# Patient Record
Sex: FEMALE | Race: WHITE | HISPANIC OR LATINO | Employment: FULL TIME | URBAN - METROPOLITAN AREA
[De-identification: names, ages, dates, MRNs, and addresses within clinical notes are randomized per-mention and may not be internally consistent; named-entity substitution may affect disease eponyms.]

---

## 2017-02-14 ENCOUNTER — ALLSCRIPTS OFFICE VISIT (OUTPATIENT)
Dept: OTHER | Facility: OTHER | Age: 22
End: 2017-02-14

## 2017-02-14 DIAGNOSIS — Z20.2 CONTACT WITH AND (SUSPECTED) EXPOSURE TO INFECTIONS WITH A PREDOMINANTLY SEXUAL MODE OF TRANSMISSION: ICD-10-CM

## 2017-02-14 DIAGNOSIS — Z00.00 ENCOUNTER FOR GENERAL ADULT MEDICAL EXAMINATION WITHOUT ABNORMAL FINDINGS: ICD-10-CM

## 2017-02-14 DIAGNOSIS — N92.0 EXCESSIVE AND FREQUENT MENSTRUATION WITH REGULAR CYCLE: ICD-10-CM

## 2017-02-14 DIAGNOSIS — D50.9 IRON DEFICIENCY ANEMIA: ICD-10-CM

## 2017-02-15 ENCOUNTER — APPOINTMENT (OUTPATIENT)
Dept: LAB | Facility: HOSPITAL | Age: 22
End: 2017-02-15

## 2017-02-15 DIAGNOSIS — Z00.00 ENCOUNTER FOR GENERAL ADULT MEDICAL EXAMINATION WITHOUT ABNORMAL FINDINGS: ICD-10-CM

## 2017-02-15 LAB
ALBUMIN SERPL BCP-MCNC: 3.8 G/DL (ref 3.5–5)
ALP SERPL-CCNC: 67 U/L (ref 46–116)
ALT SERPL W P-5'-P-CCNC: 16 U/L (ref 12–78)
ANION GAP SERPL CALCULATED.3IONS-SCNC: 9 MMOL/L (ref 4–13)
AST SERPL W P-5'-P-CCNC: 14 U/L (ref 5–45)
BASOPHILS # BLD AUTO: 0.04 THOUSANDS/ΜL (ref 0–0.1)
BASOPHILS NFR BLD AUTO: 1 % (ref 0–1)
BILIRUB SERPL-MCNC: 0.32 MG/DL (ref 0.2–1)
BUN SERPL-MCNC: 14 MG/DL (ref 5–25)
CALCIUM SERPL-MCNC: 8.9 MG/DL (ref 8.3–10.1)
CHLORIDE SERPL-SCNC: 105 MMOL/L (ref 100–108)
CHOLEST SERPL-MCNC: 165 MG/DL (ref 50–200)
CO2 SERPL-SCNC: 24 MMOL/L (ref 21–32)
CREAT SERPL-MCNC: 0.83 MG/DL (ref 0.6–1.3)
EOSINOPHIL # BLD AUTO: 0.19 THOUSAND/ΜL (ref 0–0.61)
EOSINOPHIL NFR BLD AUTO: 3 % (ref 0–6)
ERYTHROCYTE [DISTWIDTH] IN BLOOD BY AUTOMATED COUNT: 19.5 % (ref 11.6–15.1)
FSH SERPL-ACNC: 7.6 MIU/ML
GFR SERPL CREATININE-BSD FRML MDRD: >60 ML/MIN/1.73SQ M
GLUCOSE SERPL-MCNC: 89 MG/DL (ref 65–140)
HCT VFR BLD AUTO: 30.5 % (ref 34.8–46.1)
HDLC SERPL-MCNC: 47 MG/DL (ref 40–60)
HGB BLD-MCNC: 8.2 G/DL (ref 11.5–15.4)
LDLC SERPL CALC-MCNC: 102 MG/DL (ref 0–100)
LYMPHOCYTES # BLD AUTO: 1.84 THOUSANDS/ΜL (ref 0.6–4.47)
LYMPHOCYTES NFR BLD AUTO: 33 % (ref 14–44)
MCH RBC QN AUTO: 17.3 PG (ref 26.8–34.3)
MCHC RBC AUTO-ENTMCNC: 26.9 G/DL (ref 31.4–37.4)
MCV RBC AUTO: 65 FL (ref 82–98)
MONOCYTES # BLD AUTO: 0.46 THOUSAND/ΜL (ref 0.17–1.22)
MONOCYTES NFR BLD AUTO: 8 % (ref 4–12)
NEUTROPHILS # BLD AUTO: 2.98 THOUSANDS/ΜL (ref 1.85–7.62)
NEUTS SEG NFR BLD AUTO: 55 % (ref 43–75)
NRBC BLD AUTO-RTO: 0 /100 WBCS
PLATELET # BLD AUTO: 608 THOUSANDS/UL (ref 149–390)
PMV BLD AUTO: 10 FL (ref 8.9–12.7)
POTASSIUM SERPL-SCNC: 4.9 MMOL/L (ref 3.5–5.3)
PROT SERPL-MCNC: 7.9 G/DL (ref 6.4–8.2)
RBC # BLD AUTO: 4.73 MILLION/UL (ref 3.81–5.12)
SODIUM SERPL-SCNC: 138 MMOL/L (ref 136–145)
TRIGL SERPL-MCNC: 80 MG/DL
TSH SERPL DL<=0.05 MIU/L-ACNC: 1.44 UIU/ML (ref 0.36–3.74)
WBC # BLD AUTO: 5.52 THOUSAND/UL (ref 4.31–10.16)

## 2017-02-15 PROCEDURE — 84443 ASSAY THYROID STIM HORMONE: CPT

## 2017-02-15 PROCEDURE — 80053 COMPREHEN METABOLIC PANEL: CPT

## 2017-02-15 PROCEDURE — 36415 COLL VENOUS BLD VENIPUNCTURE: CPT

## 2017-02-15 PROCEDURE — 83001 ASSAY OF GONADOTROPIN (FSH): CPT

## 2017-02-15 PROCEDURE — 85025 COMPLETE CBC W/AUTO DIFF WBC: CPT

## 2017-02-15 PROCEDURE — 80061 LIPID PANEL: CPT

## 2017-02-20 ENCOUNTER — GENERIC CONVERSION - ENCOUNTER (OUTPATIENT)
Dept: OTHER | Facility: OTHER | Age: 22
End: 2017-02-20

## 2017-02-24 ENCOUNTER — LAB REQUISITION (OUTPATIENT)
Dept: LAB | Facility: HOSPITAL | Age: 22
End: 2017-02-24
Payer: COMMERCIAL

## 2017-02-24 ENCOUNTER — ALLSCRIPTS OFFICE VISIT (OUTPATIENT)
Dept: OTHER | Facility: OTHER | Age: 22
End: 2017-02-24

## 2017-02-24 DIAGNOSIS — N92.0 EXCESSIVE AND FREQUENT MENSTRUATION WITH REGULAR CYCLE: ICD-10-CM

## 2017-02-24 PROCEDURE — 88305 TISSUE EXAM BY PATHOLOGIST: CPT | Performed by: OBSTETRICS & GYNECOLOGY

## 2017-02-28 ENCOUNTER — GENERIC CONVERSION - ENCOUNTER (OUTPATIENT)
Dept: OTHER | Facility: OTHER | Age: 22
End: 2017-02-28

## 2017-03-07 ENCOUNTER — ALLSCRIPTS OFFICE VISIT (OUTPATIENT)
Dept: OTHER | Facility: OTHER | Age: 22
End: 2017-03-07

## 2017-03-07 ENCOUNTER — LAB (OUTPATIENT)
Dept: LAB | Facility: HOSPITAL | Age: 22
End: 2017-03-07
Attending: OBSTETRICS & GYNECOLOGY
Payer: COMMERCIAL

## 2017-03-07 ENCOUNTER — TRANSCRIBE ORDERS (OUTPATIENT)
Dept: ADMINISTRATIVE | Facility: HOSPITAL | Age: 22
End: 2017-03-07

## 2017-03-07 DIAGNOSIS — N92.0 EXCESSIVE OR FREQUENT MENSTRUATION: ICD-10-CM

## 2017-03-07 DIAGNOSIS — N92.0 EXCESSIVE AND FREQUENT MENSTRUATION WITH REGULAR CYCLE: ICD-10-CM

## 2017-03-07 DIAGNOSIS — D50.9 IRON DEFICIENCY ANEMIA: ICD-10-CM

## 2017-03-07 DIAGNOSIS — Z20.2 CONTACT WITH AND (SUSPECTED) EXPOSURE TO INFECTIONS WITH A PREDOMINANTLY SEXUAL MODE OF TRANSMISSION: ICD-10-CM

## 2017-03-07 DIAGNOSIS — N92.0 EXCESSIVE OR FREQUENT MENSTRUATION: Primary | ICD-10-CM

## 2017-03-07 LAB
APTT PPP: 31 SECONDS (ref 24–36)
ERYTHROCYTE [DISTWIDTH] IN BLOOD BY AUTOMATED COUNT: 19.4 % (ref 11.6–15.1)
HCG, QUALITATIVE (HISTORICAL): NEGATIVE
HCT VFR BLD AUTO: 29.7 % (ref 34.8–46.1)
HGB BLD-MCNC: 8.4 G/DL (ref 11.5–15.4)
INR PPP: 1.01 (ref 0.86–1.16)
MCH RBC QN AUTO: 18.2 PG (ref 26.8–34.3)
MCHC RBC AUTO-ENTMCNC: 28.3 G/DL (ref 31.4–37.4)
MCV RBC AUTO: 64 FL (ref 82–98)
PLATELET # BLD AUTO: 494 THOUSANDS/UL (ref 149–390)
PMV BLD AUTO: 10.6 FL (ref 8.9–12.7)
PROTHROMBIN TIME: 13.3 SECONDS (ref 12–14.3)
RBC # BLD AUTO: 4.61 MILLION/UL (ref 3.81–5.12)
WBC # BLD AUTO: 4.85 THOUSAND/UL (ref 4.31–10.16)

## 2017-03-07 PROCEDURE — 87591 N.GONORRHOEAE DNA AMP PROB: CPT

## 2017-03-07 PROCEDURE — 86592 SYPHILIS TEST NON-TREP QUAL: CPT

## 2017-03-07 PROCEDURE — 36415 COLL VENOUS BLD VENIPUNCTURE: CPT

## 2017-03-07 PROCEDURE — 85240 CLOT FACTOR VIII AHG 1 STAGE: CPT

## 2017-03-07 PROCEDURE — 85027 COMPLETE CBC AUTOMATED: CPT

## 2017-03-07 PROCEDURE — 87491 CHLMYD TRACH DNA AMP PROBE: CPT

## 2017-03-07 PROCEDURE — 85610 PROTHROMBIN TIME: CPT

## 2017-03-07 PROCEDURE — 85246 CLOT FACTOR VIII VW ANTIGEN: CPT

## 2017-03-07 PROCEDURE — 85730 THROMBOPLASTIN TIME PARTIAL: CPT

## 2017-03-07 PROCEDURE — 85245 CLOT FACTOR VIII VW RISTOCTN: CPT

## 2017-03-07 PROCEDURE — 87389 HIV-1 AG W/HIV-1&-2 AB AG IA: CPT

## 2017-03-07 PROCEDURE — 80074 ACUTE HEPATITIS PANEL: CPT

## 2017-03-08 ENCOUNTER — GENERIC CONVERSION - ENCOUNTER (OUTPATIENT)
Dept: OTHER | Facility: OTHER | Age: 22
End: 2017-03-08

## 2017-03-08 LAB
CHLAMYDIA DNA CVX QL NAA+PROBE: NORMAL
HAV IGM SER QL: NORMAL
HBV CORE IGM SER QL: NORMAL
HBV SURFACE AG SER QL: NORMAL
HCV AB SER QL: NORMAL
HIV 1+2 AB+HIV1 P24 AG SERPL QL IA: NORMAL
N GONORRHOEA DNA GENITAL QL NAA+PROBE: NORMAL
RPR SER QL: NORMAL

## 2017-03-09 LAB
FACT XIIIA PPP-ACNC: 100 % (ref 57–163)
VWF:RCO ACT/NOR PPP PL AGG: 41 % (ref 50–200)

## 2017-03-10 LAB — FACT XIIIA PPP-ACNC: 169 % (ref 57–163)

## 2017-03-11 LAB — FACT VIII AG ACT/NOR PPP IA: 163 %

## 2017-03-17 ENCOUNTER — GENERIC CONVERSION - ENCOUNTER (OUTPATIENT)
Dept: OTHER | Facility: OTHER | Age: 22
End: 2017-03-17

## 2017-03-20 ENCOUNTER — GENERIC CONVERSION - ENCOUNTER (OUTPATIENT)
Dept: OTHER | Facility: OTHER | Age: 22
End: 2017-03-20

## 2017-03-30 ENCOUNTER — ALLSCRIPTS OFFICE VISIT (OUTPATIENT)
Dept: OTHER | Facility: OTHER | Age: 22
End: 2017-03-30

## 2017-03-30 DIAGNOSIS — D50.9 IRON DEFICIENCY ANEMIA: ICD-10-CM

## 2017-04-06 ENCOUNTER — HOSPITAL ENCOUNTER (OUTPATIENT)
Dept: INFUSION CENTER | Facility: HOSPITAL | Age: 22
Discharge: HOME | End: 2017-04-06
Payer: COMMERCIAL

## 2017-04-06 VITALS
TEMPERATURE: 98.1 F | SYSTOLIC BLOOD PRESSURE: 118 MMHG | DIASTOLIC BLOOD PRESSURE: 70 MMHG | RESPIRATION RATE: 20 BRPM | HEART RATE: 89 BPM

## 2017-04-06 PROCEDURE — 96365 THER/PROPH/DIAG IV INF INIT: CPT

## 2017-04-06 PROCEDURE — 96366 THER/PROPH/DIAG IV INF ADDON: CPT

## 2017-04-06 RX ORDER — SODIUM CHLORIDE 9 MG/ML
20 INJECTION, SOLUTION INTRAVENOUS ONCE
Status: COMPLETED | OUTPATIENT
Start: 2017-04-06 | End: 2017-04-06

## 2017-04-06 RX ADMIN — SODIUM CHLORIDE 20 ML/HR: 0.9 INJECTION, SOLUTION INTRAVENOUS at 13:54

## 2017-04-06 RX ADMIN — IRON SUCROSE 300 MG: 20 INJECTION, SOLUTION INTRAVENOUS at 13:54

## 2017-04-11 ENCOUNTER — ALLSCRIPTS OFFICE VISIT (OUTPATIENT)
Dept: OTHER | Facility: OTHER | Age: 22
End: 2017-04-11

## 2017-04-11 LAB
BACTERIA UR QL AUTO: POSITIVE
CLUE CELL (HISTORICAL): NORMAL
HYPHAL YEAST (HISTORICAL): NEGATIVE
KOH PREP (HISTORICAL): POSITIVE
PH UR STRIP.AUTO: 5 [PH]
TRICHOMONAS (HISTORICAL): NEGATIVE

## 2017-04-14 RX ORDER — SODIUM CHLORIDE 9 MG/ML
20 INJECTION, SOLUTION INTRAVENOUS ONCE
Status: COMPLETED | OUTPATIENT
Start: 2017-04-15 | End: 2017-04-15

## 2017-04-15 ENCOUNTER — HOSPITAL ENCOUNTER (OUTPATIENT)
Dept: INFUSION CENTER | Facility: HOSPITAL | Age: 22
Discharge: HOME | End: 2017-04-15
Payer: COMMERCIAL

## 2017-04-15 PROCEDURE — 96365 THER/PROPH/DIAG IV INF INIT: CPT

## 2017-04-15 PROCEDURE — 96366 THER/PROPH/DIAG IV INF ADDON: CPT

## 2017-04-15 RX ADMIN — SODIUM CHLORIDE 20 ML/HR: 0.9 INJECTION, SOLUTION INTRAVENOUS at 12:20

## 2017-04-15 RX ADMIN — IRON SUCROSE 300 MG: 20 INJECTION, SOLUTION INTRAVENOUS at 12:34

## 2017-04-15 NOTE — PLAN OF CARE
Problem: Potential for Falls  Goal: Patient will remain free of falls  INTERVENTIONS:  - Assess patient frequently for physical needs  - Identify cognitive and physical deficits and behaviors that affect risk of falls    - [place] fall precautions as indicated by assessment   - Educate patient/family on patient safety including physical limitations  - Instruct patient to call for assistance with activity based on assessment  - Modify environment to reduce risk of injury  - Consider OT/PT consult to assist with strengthening/mobility   Outcome: Progressing

## 2017-04-21 RX ORDER — SODIUM CHLORIDE 9 MG/ML
20 INJECTION, SOLUTION INTRAVENOUS ONCE
Status: COMPLETED | OUTPATIENT
Start: 2017-04-22 | End: 2017-04-22

## 2017-04-22 ENCOUNTER — HOSPITAL ENCOUNTER (OUTPATIENT)
Dept: INFUSION CENTER | Facility: HOSPITAL | Age: 22
Discharge: HOME | End: 2017-04-22
Payer: COMMERCIAL

## 2017-04-22 VITALS
HEART RATE: 91 BPM | SYSTOLIC BLOOD PRESSURE: 118 MMHG | DIASTOLIC BLOOD PRESSURE: 78 MMHG | RESPIRATION RATE: 18 BRPM | TEMPERATURE: 98.4 F

## 2017-04-22 PROCEDURE — 96366 THER/PROPH/DIAG IV INF ADDON: CPT

## 2017-04-22 PROCEDURE — 96365 THER/PROPH/DIAG IV INF INIT: CPT

## 2017-04-22 RX ADMIN — SODIUM CHLORIDE 20 ML/HR: 0.9 INJECTION, SOLUTION INTRAVENOUS at 09:21

## 2017-04-22 RX ADMIN — IRON SUCROSE 300 MG: 20 INJECTION, SOLUTION INTRAVENOUS at 09:21

## 2017-04-22 NOTE — PLAN OF CARE
Problem: Potential for Falls  Goal: Patient will remain free of falls  INTERVENTIONS:  - Assess patient frequently for physical needs  - Identify cognitive and physical deficits and behaviors that affect risk of falls    - [place] fall precautions as indicated by assessment   - Educate patient/family on patient safety including physical limitations  - Instruct patient to call for assistance with activity based on assessment  - Modify environment to reduce risk of injury  - Consider OT/PT consult to assist with strengthening/mobility   Outcome: Progressing

## 2017-04-26 ENCOUNTER — GENERIC CONVERSION - ENCOUNTER (OUTPATIENT)
Dept: FAMILY MEDICINE CLINIC | Facility: CLINIC | Age: 22
End: 2017-04-26

## 2017-04-28 RX ORDER — SODIUM CHLORIDE 9 MG/ML
20 INJECTION, SOLUTION INTRAVENOUS ONCE
Status: COMPLETED | OUTPATIENT
Start: 2017-04-29 | End: 2017-04-29

## 2017-04-29 ENCOUNTER — HOSPITAL ENCOUNTER (OUTPATIENT)
Dept: INFUSION CENTER | Facility: HOSPITAL | Age: 22
Discharge: HOME | End: 2017-04-29
Payer: COMMERCIAL

## 2017-04-29 VITALS
TEMPERATURE: 96.8 F | SYSTOLIC BLOOD PRESSURE: 113 MMHG | HEART RATE: 74 BPM | RESPIRATION RATE: 16 BRPM | DIASTOLIC BLOOD PRESSURE: 77 MMHG

## 2017-04-29 PROCEDURE — 96365 THER/PROPH/DIAG IV INF INIT: CPT

## 2017-04-29 PROCEDURE — 96366 THER/PROPH/DIAG IV INF ADDON: CPT

## 2017-04-29 RX ADMIN — SODIUM CHLORIDE 20 ML/HR: 0.9 INJECTION, SOLUTION INTRAVENOUS at 09:03

## 2017-04-29 RX ADMIN — IRON SUCROSE 300 MG: 20 INJECTION, SOLUTION INTRAVENOUS at 09:03

## 2017-05-03 ENCOUNTER — APPOINTMENT (OUTPATIENT)
Dept: URGENT CARE | Facility: MEDICAL CENTER | Age: 22
End: 2017-05-03
Payer: OTHER MISCELLANEOUS

## 2017-05-03 PROCEDURE — G0382 LEV 3 HOSP TYPE B ED VISIT: HCPCS

## 2017-05-03 PROCEDURE — 99283 EMERGENCY DEPT VISIT LOW MDM: CPT

## 2017-05-04 RX ORDER — SODIUM CHLORIDE 9 MG/ML
20 INJECTION, SOLUTION INTRAVENOUS ONCE
Status: COMPLETED | OUTPATIENT
Start: 2017-05-06 | End: 2017-05-06

## 2017-05-06 ENCOUNTER — HOSPITAL ENCOUNTER (OUTPATIENT)
Dept: INFUSION CENTER | Facility: HOSPITAL | Age: 22
Discharge: HOME | End: 2017-05-06
Payer: COMMERCIAL

## 2017-05-06 VITALS
TEMPERATURE: 98.4 F | SYSTOLIC BLOOD PRESSURE: 113 MMHG | HEART RATE: 81 BPM | DIASTOLIC BLOOD PRESSURE: 82 MMHG | RESPIRATION RATE: 20 BRPM

## 2017-05-06 PROCEDURE — 96365 THER/PROPH/DIAG IV INF INIT: CPT

## 2017-05-06 PROCEDURE — 96366 THER/PROPH/DIAG IV INF ADDON: CPT

## 2017-05-06 RX ADMIN — SODIUM CHLORIDE 20 ML/HR: 0.9 INJECTION, SOLUTION INTRAVENOUS at 09:21

## 2017-05-06 RX ADMIN — IRON SUCROSE 300 MG: 20 INJECTION, SOLUTION INTRAVENOUS at 09:32

## 2017-05-06 NOTE — PLAN OF CARE
Problem: Knowledge Deficit  Goal: Patient/family/caregiver demonstrates understanding of disease process, treatment plan, medications, and discharge instructions  Complete learning assessment and assess knowledge base    Interventions:  - Provide teaching at level of understanding  - Provide teaching via preferred learning methods   Outcome: Completed Date Met:  05/06/17

## 2017-05-09 ENCOUNTER — GENERIC CONVERSION - ENCOUNTER (OUTPATIENT)
Dept: OTHER | Facility: OTHER | Age: 22
End: 2017-05-09

## 2017-05-10 ENCOUNTER — ALLSCRIPTS OFFICE VISIT (OUTPATIENT)
Dept: OTHER | Facility: OTHER | Age: 22
End: 2017-05-10

## 2017-05-10 DIAGNOSIS — K92.1 MELENA: ICD-10-CM

## 2017-05-11 ENCOUNTER — GENERIC CONVERSION - ENCOUNTER (OUTPATIENT)
Dept: OTHER | Facility: OTHER | Age: 22
End: 2017-05-11

## 2017-05-22 ENCOUNTER — ANESTHESIA EVENT (OUTPATIENT)
Dept: GASTROENTEROLOGY | Facility: MEDICAL CENTER | Age: 22
End: 2017-05-22

## 2017-05-22 ENCOUNTER — ANESTHESIA (OUTPATIENT)
Dept: GASTROENTEROLOGY | Facility: MEDICAL CENTER | Age: 22
End: 2017-05-22

## 2017-07-24 ENCOUNTER — ALLSCRIPTS OFFICE VISIT (OUTPATIENT)
Dept: OTHER | Facility: OTHER | Age: 22
End: 2017-07-24

## 2017-08-10 ENCOUNTER — APPOINTMENT (OUTPATIENT)
Dept: LAB | Facility: CLINIC | Age: 22
End: 2017-08-10
Payer: COMMERCIAL

## 2017-08-10 ENCOUNTER — TRANSCRIBE ORDERS (OUTPATIENT)
Dept: LAB | Facility: CLINIC | Age: 22
End: 2017-08-10

## 2017-08-10 DIAGNOSIS — Z00.8 HEALTH EXAMINATION IN POPULATION SURVEY: Primary | ICD-10-CM

## 2017-08-10 LAB
CHOLEST SERPL-MCNC: 131 MG/DL (ref 50–200)
EST. AVERAGE GLUCOSE BLD GHB EST-MCNC: 114 MG/DL
HBA1C MFR BLD: 5.6 % (ref 4.2–6.3)
HDLC SERPL-MCNC: 43 MG/DL (ref 40–60)
LDLC SERPL CALC-MCNC: 73 MG/DL (ref 0–100)
TRIGL SERPL-MCNC: 73 MG/DL

## 2017-08-10 PROCEDURE — 36415 COLL VENOUS BLD VENIPUNCTURE: CPT

## 2017-08-10 PROCEDURE — 80061 LIPID PANEL: CPT

## 2017-08-10 PROCEDURE — 83036 HEMOGLOBIN GLYCOSYLATED A1C: CPT

## 2017-09-06 ENCOUNTER — GENERIC CONVERSION - ENCOUNTER (OUTPATIENT)
Dept: OTHER | Facility: OTHER | Age: 22
End: 2017-09-06

## 2017-10-04 ENCOUNTER — APPOINTMENT (OUTPATIENT)
Dept: LAB | Facility: HOSPITAL | Age: 22
End: 2017-10-04
Payer: COMMERCIAL

## 2017-10-04 ENCOUNTER — GENERIC CONVERSION - ENCOUNTER (OUTPATIENT)
Dept: OTHER | Facility: OTHER | Age: 22
End: 2017-10-04

## 2017-10-04 DIAGNOSIS — N39.0 URINARY TRACT INFECTION: ICD-10-CM

## 2017-10-04 LAB
BILIRUB UR QL STRIP: NORMAL
CLARITY UR: NORMAL
COLOR UR: YELLOW
GLUCOSE (HISTORICAL): NORMAL
HGB UR QL STRIP.AUTO: NORMAL
KETONES UR STRIP-MCNC: NORMAL MG/DL
LEUKOCYTE ESTERASE UR QL STRIP: NORMAL
NITRITE UR QL STRIP: NORMAL
PH UR STRIP.AUTO: 5 [PH]
PROT UR STRIP-MCNC: 30 MG/DL
SP GR UR STRIP.AUTO: 1.02
UROBILINOGEN UR QL STRIP.AUTO: 0.2

## 2017-10-04 PROCEDURE — 87086 URINE CULTURE/COLONY COUNT: CPT

## 2017-10-05 LAB — BACTERIA UR CULT: NORMAL

## 2017-11-15 ENCOUNTER — APPOINTMENT (OUTPATIENT)
Dept: LAB | Facility: HOSPITAL | Age: 22
End: 2017-11-15
Payer: COMMERCIAL

## 2017-11-15 ENCOUNTER — ALLSCRIPTS OFFICE VISIT (OUTPATIENT)
Dept: OTHER | Facility: OTHER | Age: 22
End: 2017-11-15

## 2017-11-15 ENCOUNTER — GENERIC CONVERSION - ENCOUNTER (OUTPATIENT)
Dept: OTHER | Facility: OTHER | Age: 22
End: 2017-11-15

## 2017-11-15 DIAGNOSIS — D50.9 IRON DEFICIENCY ANEMIA: ICD-10-CM

## 2017-11-15 DIAGNOSIS — F41.9 ANXIETY DISORDER: ICD-10-CM

## 2017-11-15 LAB
BASOPHILS # BLD AUTO: 0.02 THOUSANDS/ΜL (ref 0–0.1)
BASOPHILS NFR BLD AUTO: 0 % (ref 0–1)
EOSINOPHIL # BLD AUTO: 0.09 THOUSAND/ΜL (ref 0–0.61)
EOSINOPHIL NFR BLD AUTO: 1 % (ref 0–6)
ERYTHROCYTE [DISTWIDTH] IN BLOOD BY AUTOMATED COUNT: 12.1 % (ref 11.6–15.1)
HCT VFR BLD AUTO: 41.7 % (ref 34.8–46.1)
HGB BLD-MCNC: 14.3 G/DL (ref 11.5–15.4)
IRON SATN MFR SERPL: 37 %
IRON SERPL-MCNC: 108 UG/DL (ref 50–170)
LYMPHOCYTES # BLD AUTO: 1.79 THOUSANDS/ΜL (ref 0.6–4.47)
LYMPHOCYTES NFR BLD AUTO: 26 % (ref 14–44)
MCH RBC QN AUTO: 30.4 PG (ref 26.8–34.3)
MCHC RBC AUTO-ENTMCNC: 34.3 G/DL (ref 31.4–37.4)
MCV RBC AUTO: 89 FL (ref 82–98)
MONOCYTES # BLD AUTO: 0.52 THOUSAND/ΜL (ref 0.17–1.22)
MONOCYTES NFR BLD AUTO: 7 % (ref 4–12)
NEUTROPHILS # BLD AUTO: 4.55 THOUSANDS/ΜL (ref 1.85–7.62)
NEUTS SEG NFR BLD AUTO: 66 % (ref 43–75)
NRBC BLD AUTO-RTO: 0 /100 WBCS
PLATELET # BLD AUTO: 356 THOUSANDS/UL (ref 149–390)
PMV BLD AUTO: 10.5 FL (ref 8.9–12.7)
RBC # BLD AUTO: 4.71 MILLION/UL (ref 3.81–5.12)
TIBC SERPL-MCNC: 289 UG/DL (ref 250–450)
TSH SERPL DL<=0.05 MIU/L-ACNC: 0.59 UIU/ML (ref 0.36–3.74)
WBC # BLD AUTO: 6.99 THOUSAND/UL (ref 4.31–10.16)

## 2017-11-15 PROCEDURE — 36415 COLL VENOUS BLD VENIPUNCTURE: CPT

## 2017-11-15 PROCEDURE — 85025 COMPLETE CBC W/AUTO DIFF WBC: CPT

## 2017-11-15 PROCEDURE — 84443 ASSAY THYROID STIM HORMONE: CPT

## 2017-11-15 PROCEDURE — 83550 IRON BINDING TEST: CPT

## 2017-11-15 PROCEDURE — 83540 ASSAY OF IRON: CPT

## 2017-11-16 NOTE — PROGRESS NOTES
Assessment    1  Anxiety (300 00) (F41 9)    Plan  Anxiety    · Venlafaxine HCl ER 37 5 MG Oral Capsule Extended Release 24 Hour; TAKE 1CAPSULE ONCE DAILY WITH FOOD   · (1) TSH; Status:Active; Requested for:07Vfk3655;    · Regular aerobic exercise can help reduce stress ; Status:Complete;   Done: 18JAB4852   · There are many things you can do to help control and end a panic attack  ;Status:Complete;   Done: 55VGS4661   · Follow-up visit in 6 weeks Evaluation and Treatment  Follow-up  Status: Hold For -Scheduling  Requested for: 88VEQ6791  Anxiety, Iron deficiency anemia    · (1) CBC/PLT/DIFF; Status:Active; Requested for:16Wmu9203;   Iron deficiency anemia    · (1) IRON SATURATION %, TIBC; Status:Active; Requested for:97Sos2086;     Discussion/Summary    Anxiety- we will start medication  Follow up in 6 weeks  blood work ordered for anemia and thyroid check  Possible side effects of new medications were reviewed with the patient/guardian today  The treatment plan was reviewed with the patient/guardian  The patient/guardian understands and agrees with the treatment plan      Chief Complaint  would like to discuss anxiety      History of Present Illness  Anxiety: Lanney Dandy presents with complaints of anxiety  Associated symptoms include difficulty concentrating,-- excessive worry,-- fatigue,-- insomnia,-- sweaty palms,-- sleep disruption,-- chest pain,-- headaches-- and-- racing heart, but-- no diaphoresis,-- no dizziness,-- no gastrointestinal complaints-- and-- no shortness of breath  HPI: She was previously diagnosed with depression and anxiety when she was a child  She feels overwhelmed at work and at home  She also in new relationship which is giving her stress as well  At home her grandfather from [facility], [place] lives with her as well as her sister and her family  She is also working two jobs  Last week she described being off from her second job but lying awake in bed which thoughts and chest pain  Review of Systems   Constitutional: as noted in HPI  Cardiovascular: as noted in HPI  Gastrointestinal: no complaints of abdominal pain, no constipation, no nausea or diarrhea, no vomiting, no bloody stools  Neurological: as noted in HPI  ROS reviewed  Active Problems  1  Acne vulgaris (706 1) (L70 0)   2  Acute pharyngitis, unspecified etiology (462) (J02 9)   3  Acute UTI (599 0) (N39 0)   4  Asthma (493 90) (J45 909)   5  Atopic dermatitis, mild (691 8) (L20 9)   6  Blood in stool (578 1) (K92 1)   7  Dysmenorrhea (625 3) (N94 6)   8  Headache (784 0) (R51)   9  Iron deficiency anemia (280 9) (D50 9)   10  IUD contraception (V45 51) (Z97 5)   11  Mastitis in female (611 0) (N61 0)   12  Metrorrhagia (626 6) (N92 1)   13  Nausea (787 02) (R11 0)   14  Rectal bleeding (569 3) (K62 5)    Past Medical History  1  History of Bacterial vaginosis (616 10,041 9) (N76 0,B96 89)   2  History of Encounter for insertion of mirena IUD (V25 11) (Z30 430)   3  Denied: History of alcohol abuse   4  History of anemia (V12 3) (Z86 2)   5  History of asthma (V12 69) (Z87 09)   6  History of menorrhagia (V13 29) (Z87 42)   7  Denied: History of mental disorder   8  Denied: History of substance abuse   9  History of Hives of unknown origin (708 9) (L50 9)   10  History of Potential exposure to STD (V01 6) (Z20 2)  Active Problems And Past Medical History Reviewed: The active problems and past medical history were reviewed and updated today  Family History  Mother    1  Denied: Family history of Colon cancer   2  Denied: Family history of colonic polyps   3  Denied: Family history of Crohn's disease   4  Family history of hypertension (V17 49) (Z82 49)   5  Denied: Family history of liver disease  Father    10  Denied: Family history of Colon cancer   7  Family history of cardiac disorder (V17 49) (Z82 49)   8  Denied: Family history of colonic polyps   9  Denied: Family history of Crohn's disease   10   Family history of hypertension (V17 49) (Z82 49)   11  Denied: Family history of liver disease  Family History    12  Denied: Family history of alcohol abuse   13  Denied: Family history of mental disorder   14  Denied: Family history of substance abuse  Family History Reviewed: The family history was reviewed and updated today  Social History     · Always uses seat belt   · Employed   · Exercises daily (V49 89) (Z78 9)   · Has no children   · Denied: History of domestic violence   · IUD contraception (V45 51) (Z97 5)   · Lives in apartment   · Lives with parents   · Never a smoker   · No advance directives (V49 89) (Z78 9)   · No caffeine use   · Not current smoker (V49 89) (Z78 9)   · Primary language is English   · Denied: History of Orthodoxy status   · Single   · Social alcohol use (Z78 9)   · Supportive and safe  The social history was reviewed and is unchanged  Surgical History    1  Denied: History Of Prior Surgery  Surgical History Reviewed: The surgical history was reviewed and updated today  Current Meds   1  Betamethasone Dipropionate 0 05 % External Cream; APPLY SPARINGLY TO AFFECTED AREA(S) TWICE DAILY; Therapy: 62KCX9909 to (Last Rx:16Weq8302) Ordered   2  Minocycline HCl - 100 MG Oral Capsule; TAKE 1 CAPSULE TWICE DAILY; Therapy: 19FVY3608 to (Evaluate:10Oct2017)  Requested for: 34TRA6937; Last Rx:41Kvk2136 Ordered    The medication list was reviewed and updated today  Allergies  1  No Known Drug Allergies  2  Animal dander - Cats   3  Animal dander - Dogs    Vitals   Recorded: [id number] 10:07AM   Heart Rate 76   Respiration 18   Systolic 765   Diastolic 76   Height 5 ft 1 in   Weight 133 lb 9 6 oz   BMI Calculated 25 24   BSA Calculated 1 59       Physical Exam   Constitutional  General appearance: No acute distress, well appearing and well nourished  Pulmonary  Respiratory effort: No increased work of breathing or signs of respiratory distress     Auscultation of lungs: Clear to auscultation  Cardiovascular  Auscultation of heart: Normal rate and rhythm, normal S1 and S2, without murmurs  Psychiatric  Orientation to person, place, and time: Normal    Mood and affect: Abnormal   Mood and Affect: anxious-- and-- sad  Signatures   Electronically signed by : Leanne Clifford; Nov 15 2017 10:27AM EST                       (Author)    Electronically signed by :  Maddi Gee MD; Nov 15 2017 10:43AM EST                       (Author)

## 2017-12-14 ENCOUNTER — HOSPITAL ENCOUNTER (EMERGENCY)
Facility: HOSPITAL | Age: 22
Discharge: HOME | End: 2017-12-14
Attending: EMERGENCY MEDICINE | Admitting: EMERGENCY MEDICINE
Payer: COMMERCIAL

## 2017-12-14 ENCOUNTER — GENERIC CONVERSION - ENCOUNTER (OUTPATIENT)
Dept: FAMILY MEDICINE CLINIC | Facility: CLINIC | Age: 22
End: 2017-12-14

## 2017-12-14 ENCOUNTER — APPOINTMENT (EMERGENCY)
Dept: RADIOLOGY | Facility: HOSPITAL | Age: 22
End: 2017-12-14
Payer: COMMERCIAL

## 2017-12-14 VITALS
HEART RATE: 87 BPM | SYSTOLIC BLOOD PRESSURE: 131 MMHG | RESPIRATION RATE: 16 BRPM | OXYGEN SATURATION: 100 % | DIASTOLIC BLOOD PRESSURE: 84 MMHG | TEMPERATURE: 98.6 F | WEIGHT: 129 LBS

## 2017-12-14 DIAGNOSIS — R07.89 NON-CARDIAC CHEST PAIN: Primary | ICD-10-CM

## 2017-12-14 LAB
ANION GAP SERPL CALCULATED.3IONS-SCNC: 6 MMOL/L (ref 4–13)
ATRIAL RATE: 97 BPM
BASOPHILS # BLD AUTO: 0.01 THOUSANDS/ΜL (ref 0–0.1)
BASOPHILS NFR BLD AUTO: 0 % (ref 0–1)
BUN SERPL-MCNC: 9 MG/DL (ref 5–25)
CALCIUM SERPL-MCNC: 9.1 MG/DL (ref 8.3–10.1)
CHLORIDE SERPL-SCNC: 102 MMOL/L (ref 100–108)
CO2 SERPL-SCNC: 28 MMOL/L (ref 21–32)
CREAT SERPL-MCNC: 0.77 MG/DL (ref 0.6–1.3)
DEPRECATED D DIMER PPP: <270 NG/ML (FEU) (ref 0–424)
EOSINOPHIL # BLD AUTO: 0.04 THOUSAND/ΜL (ref 0–0.61)
EOSINOPHIL NFR BLD AUTO: 1 % (ref 0–6)
ERYTHROCYTE [DISTWIDTH] IN BLOOD BY AUTOMATED COUNT: 12.5 % (ref 11.6–15.1)
EXT PREG TEST URINE: NEGATIVE
GFR SERPL CREATININE-BSD FRML MDRD: 110 ML/MIN/1.73SQ M
GLUCOSE SERPL-MCNC: 91 MG/DL (ref 65–140)
HCT VFR BLD AUTO: 42.4 % (ref 34.8–46.1)
HGB BLD-MCNC: 14.5 G/DL (ref 11.5–15.4)
LYMPHOCYTES # BLD AUTO: 1.53 THOUSANDS/ΜL (ref 0.6–4.47)
LYMPHOCYTES NFR BLD AUTO: 25 % (ref 14–44)
MCH RBC QN AUTO: 30.5 PG (ref 26.8–34.3)
MCHC RBC AUTO-ENTMCNC: 34.2 G/DL (ref 31.4–37.4)
MCV RBC AUTO: 89 FL (ref 82–98)
MONOCYTES # BLD AUTO: 0.47 THOUSAND/ΜL (ref 0.17–1.22)
MONOCYTES NFR BLD AUTO: 8 % (ref 4–12)
NEUTROPHILS # BLD AUTO: 4.19 THOUSANDS/ΜL (ref 1.85–7.62)
NEUTS SEG NFR BLD AUTO: 66 % (ref 43–75)
NRBC BLD AUTO-RTO: 0 /100 WBCS
P AXIS: 59 DEGREES
PLATELET # BLD AUTO: 316 THOUSANDS/UL (ref 149–390)
PMV BLD AUTO: 10.2 FL (ref 8.9–12.7)
POTASSIUM SERPL-SCNC: 4 MMOL/L (ref 3.5–5.3)
PR INTERVAL: 144 MS
QRS AXIS: 46 DEGREES
QRSD INTERVAL: 78 MS
QT INTERVAL: 344 MS
QTC INTERVAL: 436 MS
RBC # BLD AUTO: 4.75 MILLION/UL (ref 3.81–5.12)
SODIUM SERPL-SCNC: 136 MMOL/L (ref 136–145)
SPECIMEN SOURCE: NORMAL
T WAVE AXIS: 52 DEGREES
TROPONIN I BLD-MCNC: 0 NG/ML (ref 0–0.08)
VENTRICULAR RATE: 97 BPM
WBC # BLD AUTO: 6.24 THOUSAND/UL (ref 4.31–10.16)

## 2017-12-14 PROCEDURE — 93005 ELECTROCARDIOGRAM TRACING: CPT

## 2017-12-14 PROCEDURE — 85379 FIBRIN DEGRADATION QUANT: CPT | Performed by: EMERGENCY MEDICINE

## 2017-12-14 PROCEDURE — 80048 BASIC METABOLIC PNL TOTAL CA: CPT | Performed by: EMERGENCY MEDICINE

## 2017-12-14 PROCEDURE — 85025 COMPLETE CBC W/AUTO DIFF WBC: CPT | Performed by: EMERGENCY MEDICINE

## 2017-12-14 PROCEDURE — 93005 ELECTROCARDIOGRAM TRACING: CPT | Performed by: EMERGENCY MEDICINE

## 2017-12-14 PROCEDURE — 71020 HB CHEST X-RAY 2VW FRONTAL&LATL: CPT

## 2017-12-14 PROCEDURE — 96374 THER/PROPH/DIAG INJ IV PUSH: CPT

## 2017-12-14 PROCEDURE — 36415 COLL VENOUS BLD VENIPUNCTURE: CPT | Performed by: EMERGENCY MEDICINE

## 2017-12-14 PROCEDURE — 99285 EMERGENCY DEPT VISIT HI MDM: CPT

## 2017-12-14 PROCEDURE — 84484 ASSAY OF TROPONIN QUANT: CPT

## 2017-12-14 PROCEDURE — 81025 URINE PREGNANCY TEST: CPT | Performed by: EMERGENCY MEDICINE

## 2017-12-14 RX ORDER — KETOROLAC TROMETHAMINE 30 MG/ML
15 INJECTION, SOLUTION INTRAMUSCULAR; INTRAVENOUS ONCE
Status: COMPLETED | OUTPATIENT
Start: 2017-12-14 | End: 2017-12-14

## 2017-12-14 RX ADMIN — KETOROLAC TROMETHAMINE 15 MG: 30 INJECTION, SOLUTION INTRAMUSCULAR at 17:46

## 2017-12-14 NOTE — ED NOTES
Pt ambulatory to BR with quick, steady gait  Pt talking on cell phone while ambulating, appears to be in no acute distress         Geralyn Ganser, RN  12/14/17 1914

## 2017-12-14 NOTE — ED NOTES
Pt ambulated to BR to attempt to provide urine spec for uhcg        Quinn Martinez RN  12/14/17 9968

## 2017-12-14 NOTE — ED PROVIDER NOTES
History  Chief Complaint   Patient presents with    Chest Pain     Patient reports constant chest pain that started today around approximately 0200  Patient reports hand/arm numbness that started around 1200 today  Patient denies cough/fevers/chills  Patient denies sick contacts  History provided by:  Patient  Chest Pain   Pain location:  Substernal area  Pain quality: dull    Pain radiates to:  Does not radiate  Pain radiates to the back: no    Pain severity:  Mild  Onset quality:  Gradual  Duration:  14 hours  Timing:  Constant  Context: not breathing    Associated symptoms: anxiety (She recalls anxiety was considered at onset two weeks ago, started on effexor by PCP, did not tolerate side effects, now doesn't feel anxious but still having discomfort), numbness (paresthesia of left arm associated with it) and palpitations    Associated symptoms: no abdominal pain, no cough, no dizziness, no fever, no headache, no nausea, no shortness of breath, not vomiting and no weakness    Risk factors: no birth control, no coronary artery disease, no Shamika-Danlos syndrome, no hypertension, no immobilization, not male, not obese, not pregnant, no prior DVT/PE, no smoking and no surgery        Prior to Admission Medications   Prescriptions Last Dose Informant Patient Reported? Taking?   fluticasone (FLOVENT HFA) 110 MCG/ACT inhaler More than a month at Unknown time  Yes No   Sig: Inhale 2 puffs 2 (two) times a day   sertraline (ZOLOFT) 50 mg tablet 12/13/2017 at 1100  Yes Yes   Sig: Take by mouth      Facility-Administered Medications: None       Past Medical History:   Diagnosis Date    Anemia     Asthma     Von Willebrand disease ([facility] [address] )        No past surgical history on file  No family history on file  I have reviewed and agree with the history as documented      Social History   Substance Use Topics    Smoking status: Never Smoker    Smokeless tobacco: Never Used    Alcohol use No        Review of Systems Constitutional: Negative for appetite change, chills and fever  HENT: Negative for sore throat  Respiratory: Negative for cough, shortness of breath and wheezing  Cardiovascular: Positive for chest pain and palpitations  Gastrointestinal: Negative for abdominal pain, diarrhea, nausea and vomiting  Genitourinary: Negative for dysuria and hematuria  Musculoskeletal: Negative for neck pain  Skin: Negative for rash  Neurological: Positive for numbness (paresthesia of left arm associated with it)  Negative for dizziness, weakness and headaches  Psychiatric/Behavioral: Negative for suicidal ideas  All other systems reviewed and are negative  Physical Exam  ED Triage Vitals [12/14/17 1533]   Temperature Pulse Respirations Blood Pressure SpO2   98 6 °F (37 °C) 98 18 128/86 98 %      Temp Source Heart Rate Source Patient Position - Orthostatic VS BP Location FiO2 (%)   Oral Monitor Sitting Right arm --      Pain Score       7           Orthostatic Vital Signs  Vitals:    12/14/17 1533   BP: 128/86   Pulse: 98   Patient Position - Orthostatic VS: Sitting       Physical Exam   Constitutional: She is oriented to person, place, and time  Vital signs are normal  She appears well-developed and well-nourished  Non-toxic appearance  HENT:   Head: Normocephalic and atraumatic  Right Ear: Tympanic membrane and external ear normal    Left Ear: Tympanic membrane and external ear normal    Nose: Nose normal    Mouth/Throat: Oropharynx is clear and moist    Eyes: Conjunctivae and EOM are normal  Pupils are equal, round, and reactive to light  Neck: Normal range of motion and full passive range of motion without pain  Neck supple  No Brudzinski's sign and no Kernig's sign noted  Cardiovascular: Normal rate, regular rhythm, normal heart sounds, intact distal pulses and normal pulses  No murmur heard  Pulmonary/Chest: Effort normal and breath sounds normal  No tachypnea  No respiratory distress   She has no wheezes  Abdominal: Soft  Bowel sounds are normal  She exhibits no distension  There is no tenderness  There is no rigidity, no rebound and no guarding  Musculoskeletal: Normal range of motion  Right lower leg: She exhibits no swelling  Left lower leg: She exhibits no swelling  Lymphadenopathy:     She has no cervical adenopathy  Neurological: She is alert and oriented to person, place, and time  She has normal strength and normal reflexes  No cranial nerve deficit or sensory deficit  Coordination and gait normal  GCS eye subscore is 4  GCS verbal subscore is 5  GCS motor subscore is 6  Skin: Skin is warm and dry  No rash noted  She is not diaphoretic  No pallor  Psychiatric: She has a normal mood and affect  Her speech is normal and behavior is normal  Judgment and thought content normal  Cognition and memory are normal    Nursing note and vitals reviewed  ED Medications  Medications   ketorolac (TORADOL) injection 15 mg (not administered)       Diagnostic Studies  Results Reviewed     Procedure Component Value Units Date/Time    D-dimer, quantitative [[id number]]  (Normal) Collected:  12/14/17 1631    Lab Status:  Final result Specimen:  Blood from Arm, Right Updated:  12/14/17 1719     D-Dimer, Quant <270 ng/ml (FEU)     Basic metabolic panel [[id number]] Collected:  12/14/17 1631    Lab Status:  Final result Specimen:  Blood from Arm, Right Updated:  12/14/17 1648     Sodium 136 mmol/L      Potassium 4 0 mmol/L      Chloride 102 mmol/L      CO2 28 mmol/L      Anion Gap 6 mmol/L      BUN 9 mg/dL      Creatinine 0 77 mg/dL      Glucose 91 mg/dL      Calcium 9 1 mg/dL      eGFR 110 ml/min/1 73sq m     Narrative:         National Kidney Disease Education Program recommendations are as follows:  GFR calculation is accurate only with a steady state creatinine  Chronic Kidney disease less than 60 ml/min/1 73 sq  meters  Kidney failure less than 15 ml/min/1 73 sq  meters      POCT troponin [[id number]]  (Normal) Collected:  12/14/17 1630    Lab Status:  Final result Updated:  12/14/17 1644     POC Troponin I 0 00 ng/ml      Specimen Type VENOUS    Narrative:         [employer] i-Stat handheld analyzer 99% cutoff is > 0 08ng/mL in network Emergency Departments    o cTnI 99% cutoff is useful only when applied to patients in the clinical setting of myocardial ischemia  o cTnI 99% cutoff should be interpreted in the context of clinical history, ECG findings and possibly cardiac imaging to establish correct diagnosis  o cTnI 99% cutoff may be suggestive but clearly not indicative of a coronary event without the clinical setting of myocardial ischemia      CBC and differential [[id number]]  (Normal) Collected:  12/14/17 1631    Lab Status:  Final result Specimen:  Blood from Arm, Right Updated:  12/14/17 1638     WBC 6 24 Thousand/uL      RBC 4 75 Million/uL      Hemoglobin 14 5 g/dL      Hematocrit 42 4 %      MCV 89 fL      MCH 30 5 pg      MCHC 34 2 g/dL      RDW 12 5 %      MPV 10 2 fL      Platelets 399 Thousands/uL      nRBC 0 /100 WBCs      Neutrophils Relative 66 %      Lymphocytes Relative 25 %      Monocytes Relative 8 %      Eosinophils Relative 1 %      Basophils Relative 0 %      Neutrophils Absolute 4 19 Thousands/µL      Lymphocytes Absolute 1 53 Thousands/µL      Monocytes Absolute 0 47 Thousand/µL      Eosinophils Absolute 0 04 Thousand/µL      Basophils Absolute 0 01 Thousands/µL     POCT pregnancy, urine [[id number]]     Lab Status:  No result                  XR chest 2 views    (Results Pending)              Procedures  ECG 12 Lead Documentation  Date/Time: 12/14/2017 3:42 PM  Performed by: Jennifer Gilliland  Authorized by: Karen JOSE     Rate:     ECG rate:  97    ECG rate assessment: normal    Rhythm:     Rhythm: sinus rhythm    Ectopy:     Ectopy: none    QRS:     QRS axis:  Normal    QRS intervals:  Normal  Conduction:     Conduction: normal    ST segments:     ST segments: Normal  T waves:     T waves: normal             Phone Contacts  ED Phone Contact    ED Course  ED Course                                MDM  Number of Diagnoses or Management Options  Diagnosis management comments: She has h/o anemia that has required blood transfusions, but not recent       Amount and/or Complexity of Data Reviewed  Review and summarize past medical records: (TSH check 11/15/17 normal)      CritCare Time    Disposition  Final diagnoses:   Non-cardiac chest pain     Time reflects when diagnosis was documented in both MDM as applicable and the Disposition within this note     Time User Action Codes Description Comment    12/14/2017  5:21 PM Dennis Elkins Add [R07 89] Non-cardiac chest pain       ED Disposition     ED Disposition Condition Comment    Discharge  Yolanda Chanel discharge to home/self care  Condition at discharge: Good        Follow-up Information     Follow up With Specialties Details Why Contact Info    Vamsi Nesbitt, DO Family Medicine Go to For followup [address]  [id number]  [phone]          Patient's Medications   Discharge Prescriptions    No medications on file     No discharge procedures on file      ED Provider  Electronically Signed by           Jabari Wheeler MD  12/14/17 2617

## 2017-12-14 NOTE — ED NOTES
Pt c/o CP that she has had all day  Pt points to upper epigastric region for pain location  Pt was taking Effexor but had feelings of her heart racing, so she stopped taking that medication and her FMD switched her to Sertraline, giving her the prescription on Monday  Pt started taking Sertraline yesterday, took one dose yesterday morning, felt like her heart was racing, and has not taken any additional doses  Pt states all she had to eat today is a bagel and a turkey sandwich         Sofia Love RN  12/14/17 1894

## 2017-12-14 NOTE — DISCHARGE INSTRUCTIONS
Costochondritis   WHAT YOU NEED TO KNOW:   Costochondritis is a condition that causes pain in the cartilage that connect your ribs to your sternum (breastbone)  Cartilage is the tough, bendable tissue that protects your bones  DISCHARGE INSTRUCTIONS:   Medicines:   · Acetaminophen: This medicine decreases pain  Follow directions  Acetaminophen can cause liver damage if not taken correctly  NSAIDs , such as ibuprofen, help decrease swelling, pain, and fever  NSAIDs can cause stomach bleeding or kidney problems in certain people  If you take blood thinner medicine, always ask if NSAIDs are safe for you  Heat:  Heat helps decrease pain in some patients  Apply heat on the area for 20 to 30 minutes every 2 hours for as many days as directed  Ice:  Ice helps decrease swelling and pain  Ice may also help prevent tissue damage  Use an ice pack, or put crushed ice in a plastic bag  Cover it with a towel and place it on the painful area for 15 to 20 minutes every hour or as directed  Stretching exercises:  Gentle stretching may help your symptoms   a doorway and put your hands on the door frame at the level of your ears or shoulders  Take 1 step forward and gently stretch your chest  Try this with your hands higher up on the doorway  Contact your healthcare provider if:   · You have a fever  · The painful areas of your chest look swollen, red, and feel warm to the touch  · You cannot sleep because of the pain  · You have questions or concerns about your condition or care

## 2017-12-18 ENCOUNTER — ALLSCRIPTS OFFICE VISIT (OUTPATIENT)
Dept: OTHER | Facility: OTHER | Age: 22
End: 2017-12-18

## 2017-12-18 ENCOUNTER — TRANSCRIBE ORDERS (OUTPATIENT)
Dept: ADMINISTRATIVE | Facility: HOSPITAL | Age: 22
End: 2017-12-18

## 2017-12-18 DIAGNOSIS — R07.89 OTHER CHEST PAIN: Primary | ICD-10-CM

## 2017-12-18 DIAGNOSIS — R07.89 OTHER CHEST PAIN: ICD-10-CM

## 2017-12-29 ENCOUNTER — ALLSCRIPTS OFFICE VISIT (OUTPATIENT)
Dept: OTHER | Facility: OTHER | Age: 22
End: 2017-12-29

## 2017-12-30 NOTE — PROGRESS NOTES
Assessment   1  Mild intermittent asthma without complication (720 84) (U21 14)   2  Bronchitis (490) (J40)    Plan   Bronchitis    · Azithromycin 250 MG Oral Tablet; TAKE 2 TABLETS ON DAY 1 THEN TAKE 1    TABLET A DAY FOR 4 DAYS   · Benzonatate 200 MG Oral Capsule; TAKE 1 CAPSULE 3 TIMES DAILY AS    NEEDED  Mild intermittent asthma without complication    · Ventolin  (90 Base) MCG/ACT Inhalation Aerosol Solution; INHALE 2    PUFFS EVERY 4 HOURS AS NEEDED    Discussion/Summary      See how pt does on meds  Possible side effects of new medications were reviewed with the patient/guardian today  The treatment plan was reviewed with the patient/guardian  The patient/guardian understands and agrees with the treatment plan      Chief Complaint   Pt here for cough, chest congestion, sore throat, head ache, sinus pressure, body ache, body chills  History of Present Illness   Cold Symptoms:    Amy Ramírez presents with complaints of sudden onset of constant episodes of moderate cold symptoms  Her symptoms are caused by work contact with URI  Symptoms are worsening Risk Factors: poor sleep and exposure to URI (cough kept up a lot of the night)      Associated symptoms include nasal congestion,-- sore throat,-- headache,-- ear pain,-- fatigue,-- nausea-- and-- chills, but-- no wheezing,-- no vomiting,-- no diarrhea-- and-- no fever  Review of Systems        Constitutional: feeling poorly,-- chills-- and-- feeling tired, but-- no fever  ENT: earache-- and-- sore throat  Respiratory: cough  Gastrointestinal: nausea  Neurological: headache  Active Problems   1  Acne vulgaris (706 1) (L70 0)   2  Anxiety (300 00) (F41 9)   3  Atopic dermatitis, mild (691 8) (L20 9)   4  Atypical chest pain (786 59) (R07 89)   5  Blood in stool (578 1) (K92 1)   6  Dysmenorrhea (625 3) (N94 6)   7  Headache (784 0) (R51)   8  Iron deficiency anemia (280 9) (D50 9)   9   IUD contraception (V45 51) (Z97 5)   10  Mastitis in female (611 0) (N61 0)   11  Metrorrhagia (626 6) (N92 1)   12  Nausea (787 02) (R11 0)   13  Rectal bleeding (569 3) (K62 5)   14  Tachycardia (785 0) (R00 0)    Past Medical History   1  History of Bacterial vaginosis (616 10,041 9) (N76 0,B96 89)   2  History of Encounter for insertion of mirena IUD (V25 11) (Z30 430)   3  Denied: History of alcohol abuse   4  History of anemia (V12 3) (Z86 2)   5  History of asthma (V12 69) (Z87 09)   6  History of menorrhagia (V13 29) (Z87 42)   7  Denied: History of mental disorder   8  Denied: History of substance abuse   9  History of Hives of unknown origin (708 9) (L50 9)   10  History of Potential exposure to STD (V01 6) (Z20 2)  Active Problems And Past Medical History Reviewed: The active problems and past medical history were reviewed and updated today  Family History   Mother    1  Denied: Family history of Colon cancer   2  Denied: Family history of colonic polyps   3  Denied: Family history of Crohn's disease   4  Family history of hypertension (V17 49) (Z82 49)   5  Denied: Family history of liver disease  Father    10  Denied: Family history of Colon cancer   7  Family history of cardiac disorder (V17 49) (Z82 49)   8  Denied: Family history of colonic polyps   9  Family history of coronary artery disease (V17 3) (Z82 49)   10  Denied: Family history of Crohn's disease   6  Family history of hypertension (V17 49) (Z82 49)   12  Denied: Family history of liver disease  Family History    15  Denied: Family history of alcohol abuse   14  Denied: Family history of mental disorder   15  Denied: Family history of substance abuse  Family History Reviewed: The family history was reviewed and updated today         Social History    · Always uses seat belt   · Employed   · Exercises daily (V49 89) (Z78 9)   · Has no children   · Denied: History of domestic violence   · IUD contraception (V45 51) (Z97 5)   · Lives in apartment   · Lives with parents   · Never a smoker   · No advance directives (V49 89) (Z78 9)   · No caffeine use   · Not current smoker (V49 89) (Z78 9)   · Primary language is English   · Denied: History of Scientology status   · Single   · Social alcohol use (Z78 9)   · Supportive and safe  The social history was reviewed and updated today  Surgical History   1  Denied: History Of Prior Surgery  Surgical History Reviewed: The surgical history was reviewed and updated today  Current Meds    1  Azithromycin 250 MG Oral Tablet; TAKE 2 TABLETS ON DAY 1 THEN TAKE 1 TABLET A     DAY FOR 4 DAYS; Therapy: 50Flt4799 to (Last Voncile Purpura)  Requested for: 86Bqc2510 Ordered     The medication list was reviewed and updated today  Allergies   1  No Known Drug Allergies  2  Animal dander - Cats   3  Animal dander - Dogs    Vitals    Recorded: 26XYP0067 08:04AM   Temperature 98 4 F, Temporal   Heart Rate 96   Respiration 16   Systolic 018, Sitting   Diastolic 90, Sitting   Height 5 ft 1 in   Weight 133 lb    BMI Calculated 25 13   BSA Calculated 1 59     Physical Exam        Constitutional      General appearance: No acute distress, well appearing and well nourished  Ears, Nose, Mouth, and Throat      Otoscopic examination: Tympanic membranes translucent with normal light reflex  Canals patent without erythema  Oropharynx: Normal with no erythema, edema, exudate or lesions  Pulmonary      Auscultation of lungs: Abnormal   wheezing over both bases  Lymphatic      Palpation of lymph nodes in neck: No lymphadenopathy  Message   Return to work or school:    Ella Epley is under my professional care  She was seen in my office on 12/29/17    She is able to return to work on  1/2/16             Future Appointments      Date/Time Provider Specialty Site   01/23/2018 05:00 PM DI [place]  Cardiology  CARDIOLOGY  [place]     Signatures    Electronically signed by :  Maddi Gee MD; Dec 29 2017  8:23AM EST                       (Author)

## 2018-01-11 NOTE — MISCELLANEOUS
Message   Recorded as Task   Date: 02/28/2017 07:29 AM, Created By: Madelin Avila   Task Name: Go to Result   Assigned To: [facility]   Regarding Patient: Wayne Zheng, Status: In Progress   Gypsy Vidal - 28 Feb 2017 7:29 AM     TASK CREATED  Endometrial biopsy is benign, please inform the patient   Carol Ann George - 28 Feb 2017 8:15 AM     TASK IN PROGRESS   Carol Ann George - 28 Feb 2017 8:28 AM     TASK EDITED  pt informed        Active Problems    1  Dysmenorrhea (625 3) (N94 6)   2  Iron deficiency anemia (280 9) (D50 9)   3  Menorrhagia (626 2) (N92 0)   4  Potential exposure to STD (V01 6) (Z20 2)    Current Meds   1  Sprintec 28 0 25-35 MG-MCG Oral Tablet; TAKE 1 TABLET DAILY AS DIRECTED; Therapy: 85EFT7510 to (Evaluate:14Mtl7468)  Requested for: 79WXQ8213; Last   Rx:35Huo1743 Ordered   2  Sprintec 28 0 25-35 MG-MCG Oral Tablet; Take as directed Recorded    Allergies    1  No Known Drug Allergies    2  Animal dander - Cats   3   Animal dander - Dogs    Signatures   Electronically signed by : Dada Giordano, ; Feb 28 2017  8:28AM EST                       (Author)

## 2018-01-12 ENCOUNTER — GENERIC CONVERSION - ENCOUNTER (OUTPATIENT)
Dept: OTHER | Facility: OTHER | Age: 23
End: 2018-01-12

## 2018-01-12 NOTE — MISCELLANEOUS
Message  PT WAS A NO SHOW FOR HER APPT TODAY WITH DR Osiel Carlson  CALLED, NO ANSWER, LEFT MESSAGE FOR PT TO CALL BACK AND RESCHEDULE  Active Problems    1  Acute pharyngitis, unspecified etiology (462) (J02 9)   2  Asthma (493 90) (J45 909)   3  Atopic dermatitis, mild (691 8) (L20 9)   4  Blood in stool (578 1) (K92 1)   5  Dysmenorrhea (625 3) (N94 6)   6  Headache (784 0) (R51)   7  Iron deficiency anemia (280 9) (D50 9)   8  IUD contraception (V45 51) (Z97 5)   9  Metrorrhagia (626 6) (N92 1)   10  Nausea (787 02) (R11 0)   11  Rectal bleeding (569 3) (K62 5)    Current Meds   1  Azithromycin 250 MG Oral Tablet; TAKE 2 TABLETS ON DAY 1 THEN TAKE 1 TABLET A   DAY FOR 4 DAYS; Therapy: 10CYS0840 to (Last Rx:19Jun2017) Ordered   2  Betamethasone Dipropionate 0 05 % External Cream; APPLY SPARINGLY TO   AFFECTED AREA(S) TWICE DAILY; Therapy: 30VVL9138 to (Last Rx:99Lfg3589) Ordered    Allergies    1  No Known Drug Allergies    2  Animal dander - Cats   3   Animal dander - Dogs    Signatures   Electronically signed by : Jeri Arita, ; Sep  6 2017  9:32AM EST                       (Author)

## 2018-01-13 VITALS
HEART RATE: 86 BPM | SYSTOLIC BLOOD PRESSURE: 98 MMHG | OXYGEN SATURATION: 100 % | BODY MASS INDEX: 22.66 KG/M2 | RESPIRATION RATE: 18 BRPM | WEIGHT: 120 LBS | HEIGHT: 61 IN | TEMPERATURE: 97.9 F | DIASTOLIC BLOOD PRESSURE: 80 MMHG

## 2018-01-13 VITALS
WEIGHT: 116.6 LBS | HEIGHT: 61 IN | SYSTOLIC BLOOD PRESSURE: 122 MMHG | HEART RATE: 78 BPM | DIASTOLIC BLOOD PRESSURE: 70 MMHG | BODY MASS INDEX: 22.01 KG/M2

## 2018-01-13 VITALS
WEIGHT: 120 LBS | SYSTOLIC BLOOD PRESSURE: 110 MMHG | HEIGHT: 61 IN | BODY MASS INDEX: 22.66 KG/M2 | DIASTOLIC BLOOD PRESSURE: 64 MMHG

## 2018-01-13 VITALS — SYSTOLIC BLOOD PRESSURE: 122 MMHG | DIASTOLIC BLOOD PRESSURE: 90 MMHG

## 2018-01-13 NOTE — MISCELLANEOUS
Message   Recorded as Task   Date: 05/09/2017 01:54 PM, Created By: David Malhotra   Task Name: Call Back   Assigned To: Carol Ann Copeland   Regarding Patient: Iraida Barragan, Status: Active   Comment:    Janie Mckeon - 09 May 2017 1:54 PM     TASK CREATED  PT CALLED SAYING SHE JUST FINISHED HER IRON INFUSIONS BUT HAS BEEN HAVING LOOSE STOOLS AND TODAY VERY LOOSE WITH BLOOD    PLEASE CALL HER BACK [phone]   [facility] [employer] - 09 May 2017 2:23 PM     TASK EDITED  SPOKE WITH PATIENT  SHE HAS BEEN HAVING LOOSE STOOLS  SHE STARTED WITH SOME RECTAL BLEEDING TODAY WITH ABDOMINAL PAIN    WILL D/W DR Sedrick Santana AND GET BACK TO PATIENT   Carol Ann Copeland - 09 May 2017 2:32 PM     TASK EDITED  D/W DR Sedrick Santana    have patient repeat Luis Miguel Aures profile and see GI doctor    attempted to call patient at work, office now closed   [facility] [employer] [facility] - 09 May 2017 2:38 PM     TASK EDITED  left message on patient's cell with instructions to repeat labs and call me to discuss        Active Problems    1  Asthma (493 90) (J45 909)   2  Bacterial vaginosis (616 10,041 9) (N76 0,B96 89)   3  Dysmenorrhea (625 3) (N94 6)   4  Encounter for insertion of mirena IUD (V25 11) (Z30 430)   5  Hives of unknown origin (708 9) (L50 9)   6  Iron deficiency anemia (280 9) (D50 9)   7  IUD contraception (V45 51) (Z97 5)   8  Metrorrhagia (626 6) (N92 1)   9  Potential exposure to STD (V01 6) (Z20 2)    Current Meds   1  Flovent  MCG/ACT Inhalation Aerosol; INHALE 2 PUFFS TWICE DAILY; Therapy: 74KGE9026 to (Devan Garcia)  Requested for: 86RRZ2869; Last   Rx:03Mar2017 Ordered   2  MethylPREDNISolone 4 MG Oral Tablet Therapy Pack; take as directed; Therapy: 71FBN2878 to (Evaluate:90Eas1150); Last Rx:42Hvd3349 Ordered   3  Sprintec 28 0 25-35 MG-MCG Oral Tablet; TAKE 1 TABLET DAILY AS DIRECTED; Therapy: 07HQP7369 to (Evaluate:94Oul7781)  Requested for: 15Dgu0626; Last   Rx:56Avj7430 Ordered    Allergies    1  No Known Drug Allergies    2   Animal dander - Cats   3  Animal dander - Dogs    Plan  PMH: History of menorrhagia    · (LC) von Willebrand Profile; Status:Active - Retrospective By Protocol Authorization;   Requested for:41Jtj8722;     Signatures   Electronically signed by : Anahi Venegas, ; May  9 2017  2:38PM EST                       (Author)

## 2018-01-13 NOTE — MISCELLANEOUS
Message   Recorded as Task   Date: 03/10/2017 07:47 AM, Created By: Adan Mendoza   Task Name: Miscellaneous   Assigned To: Carol Ann George   Regarding Patient: Saurav Ashley, Status: In Progress   Comment:    Kirk Gallardo - 10 Mar 2017 7:47 AM     TASK CREATED  This test is low, possibly suggestive of von Willebrand's disease  Recommend referral to hematology  Carol Ann George - 10 Mar 2017 10:12 AM     TASK IN PROGRESS   Carol Ann George - 10 Mar 2017 10:23 AM     TASK REASSIGNED: Previously Assigned To ZURI CHAIREZ [facility]  please have your staff call this pt for an appt  Juani Minaya - 10 Mar 2017 1:15 PM     TASK REASSIGNED: Previously Assigned To Erendira Delaney - 16 Mar 2017 12:26 PM     TASK REASSIGNED: Previously Assigned To Carolina French  this is a new patient-thank you   Ramya Nunes - 16 Mar 2017 2:56 PM     TASK REPLIED TO: Previously Assigned To Bernabe Henry  scheduled pt with Dr Gilda Dumont on 3/30/17  Carol Ann George - 17 Mar 2017 7:38 AM     TASK REASSIGNED: Previously Assigned To Dave Munoz - 17 Mar 2017 3:39 PM     TASK REPLIED TO: Previously Assigned To levi Scott        Active Problems    1  Asthma (493 90) (J45 909)   2  Dysmenorrhea (625 3) (N94 6)   3  Encounter for insertion of mirena IUD (V25 11) (Z30 430)   4  Iron deficiency anemia (280 9) (D50 9)   5  Menorrhagia (626 2) (N92 0)   6  Potential exposure to STD (V01 6) (Z20 2)    Current Meds   1  Flovent  MCG/ACT Inhalation Aerosol; INHALE 2 PUFFS TWICE DAILY; Therapy: 35GNF9613 to (Amanda Griffin)  Requested for: 75HOP5452; Last   Rx:03Mar2017 Ordered   2  Sprintec 28 0 25-35 MG-MCG Oral Tablet; TAKE 1 TABLET DAILY AS DIRECTED; Therapy: 42EWY3175 to (Evaluate:66Pon4022)  Requested for: 44POQ4778; Last   Rx:24Imf6267 Ordered   3  Sprintec 28 0 25-35 MG-MCG Oral Tablet; Take as directed Recorded    Allergies    1  No Known Drug Allergies    2   Animal dander - Cats   3   Animal dander - Dogs    Signatures   Electronically signed by : Hoang Saldana, ; Mar 20 2017  7:15AM EST                       (Author)

## 2018-01-13 NOTE — PROGRESS NOTES
Assessment    1  Encounter for preventive health examination (V70 0) (Z00 00)    Plan   (1) CBC/PLT/DIFF; Status:Resulted - Requires Verification;   Done: 81FGE6491 12:00AM  Due:00Qsu8167; Ordered;    For:Health Maintenance; Ordered By:Tea Caballero;   (1) COMPREHENSIVE METABOLIC PANEL; Status:Resulted - Requires Verification;   Done: 50NTO0918 12:00AM  Due:98Qkk9568; Ordered;    For:Health Maintenance; Ordered By:Tea Caballero;   (1) LIPID PANEL FASTING W DIRECT LDL REFLEX; Status:Resulted - Requires Verification;   Done: 80IMQ1954 12:00AM  Due:73Gey4201; Ordered;    For:Health Maintenance; Ordered By:Tea Caballero;   (1) TSH; Status:Resulted - Requires Verification;   Done: 44ALJ8843 12:00AM  Due:02Dto3266; Ordered;    For:Health Maintenance; Ordered By:Tea Caballero;   (1) [facility]; Status:Resulted - Requires Verification;   Done: 34NOC5814 12:00AM  Due:11Wjg7740; Ordered;    For:Health Maintenance; Ordered By:Tea Caballero;      Discussion/Summary  health maintenance visit Currently, she eats a healthy diet and has an adequate exercise regimen  Breast cancer screening: breast cancer screening is not indicated  Colorectal cancer screening: colorectal cancer screening is not indicated  Osteoporosis screening: bone mineral density testing is not indicated  Screening lab work includes hemoglobin, glucose, lipid profile and thyroid function testing  Advice and education were given regarding nutrition  Patient discussion: discussed with the patient  Chief Complaint  physical exam      History of Present Illness  , Adult Female: The patient is being seen for a health maintenance evaluation  General Health: The patient's health since the last visit is described as good  She does not have regular dental visits  She denies vision problems  She denies hearing loss  Lifestyle:  She consumes a diverse and healthy diet  She does not have any weight concerns  She exercises regularly  She does not use tobacco  She denies alcohol use   She denies drug use  Reproductive health: the patient is premenopausal   she reports normal menses  Menstrual history: LMP: there was excessive bleeding  Recent menstrual periods: bleeding has been excessive  The cycles are irregular  Menstrual Problems: dysmenorrhea, HEAVY FREGUENT BLEEDING ANEMIC  Screening:   HPI: PHYSICAL, PT CHRONICALLY ANEMIC DUE TO MENSTRUAL ABNORMALITIES      Review of Systems    Constitutional: feeling tired  Eyes: No complaints of eye pain, no red eyes, no eyesight problems, no discharge, no dry eyes, no itching of eyes  ENT: no complaints of earache, no loss of hearing, no nose bleeds, no nasal discharge, no sore throat, no hoarseness  Cardiovascular: No complaints of slow heart rate, no fast heart rate, no chest pain, no palpitations, no leg claudication, no lower extremity edema  Respiratory: No complaints of shortness of breath, no wheezing, no cough, no SOB on exertion, no orthopnea, no PND  Gastrointestinal: No complaints of abdominal pain, no constipation, no nausea or vomiting, no diarrhea, no bloody stools  Genitourinary: pelvic pain, dysmenorrhea and unexplained vaginal bleeding  Musculoskeletal: No complaints of arthralgias, no myalgias, no joint swelling or stiffness, no limb pain or swelling  Integumentary: No complaints of skin rash or lesions, no itching, no skin wounds, no breast pain or lump  Neurological: No complaints of headache, no confusion, no convulsions, no numbness, no dizziness or fainting, no tingling, no limb weakness, no difficulty walking  Psychiatric: Not suicidal, no sleep disturbance, no anxiety or depression, no change in personality, no emotional problems  Endocrine: No complaints of proptosis, no hot flashes, no muscle weakness, no deepening of the voice, no feelings of weakness  Hematologic/Lymphatic: No complaints of swollen glands, no swollen glands in the neck, does not bleed easily, does not bruise easily        Active Problems   Iron deficiency anemia (280 9) (D50 9)          Past Medical History    · Denied: History of alcohol abuse   · History of anemia (V12 3) (Z86 2)   · History of asthma (V12 69) (Z87 09)   · Denied: History of mental disorder   · Denied: History of substance abuse    Surgical History    · Denied: History Of Prior Surgery    Family History  Mother    · Family history of hypertension (V17 49) (Z82 49)  Father    · Family history of cardiac disorder (V17 49) (Z82 49)   · Family history of hypertension (V17 49) (Z82 49)  Family History    · Denied: Family history of alcohol abuse   · Denied: Family history of mental disorder   · Denied: Family history of substance abuse    Social History    · Always uses seat belt   · Employed   · Exercises daily (V49 89) (Z78 9)   · Has no children   · Denied: History of domestic violence   · Lives in apartment   · Lives with parents   · Never a smoker   · No advance directives (V49 89) (Z78 9)   · No caffeine use   · Not current smoker (V49 89) (Z78 9)   · Primary language is English   · Denied: History of Caodaism status   · Single   · Social alcohol use (Z78 9)   · Supportive and safe    Current Meds   1  Sprintec 28 0 25-35 MG-MCG Oral Tablet; Take as directed Recorded    Allergies    1  No Known Drug Allergies    2  Animal dander - Cats   3  Animal dander - Dogs    Vitals   Recorded: I1019801 01:04PM   Heart Rate  78   Systolic 851    Diastolic 70    Height 5 ft 1 in    Weight 116 lb 9 6 oz    BMI Calculated 22 03    BSA Calculated 1 5      Physical Exam    Constitutional   General appearance: No acute distress, well appearing and well nourished  Eyes   Pupils and irises: Equal, round, reactive to light  Ears, Nose, Mouth, and Throat   Otoscopic examination: Tympanic membranes translucent with normal light reflex  Canals patent without erythema  Oropharynx: Normal with no erythema, edema, exudate or lesions      Neck   Neck: Supple, symmetric, trachea midline, no masses  Pulmonary   Auscultation of lungs: Clear to auscultation  Cardiovascular   Auscultation of heart: Normal rate and rhythm, normal S1 and S2, no murmurs  Examination of extremities for edema and/or varicosities: Normal     Lymphatic   Palpation of lymph nodes in neck: No lymphadenopathy  Musculoskeletal   Gait and station: Normal     Skin   Skin and subcutaneous tissue: Normal without rashes or lesions  Neurologic   Cranial nerves: Cranial nerves II-XII intact  Psychiatric   Orientation to person, place, and time: Normal        Results/Data  PHQ-9 Adult Depression Screening 29RUH5203 12:50PM User, Glycode     Test Name Result Flag Reference   PHQ-9 Adult Depression Score 0     Over the last two weeks, how often have you been bothered by any of the following problems? Little interest or pleasure in doing things: Not at all - 0  Feeling down, depressed, or hopeless: Not at all - 0  Trouble falling or staying asleep, or sleeping too much: Not at all - 0  Feeling tired or having little energy: Not at all - 0  Poor appetite or over eating: Not at all - 0  Feeling bad about yourself - or that you are a failure or have let yourself or your family down: Not at all - 0  Trouble concentrating on things, such as reading the newspaper or watching television: Not at all - 0  Moving or speaking so slowly that other people could have noticed  Or the opposite -  being so fidgety or restless that you have been moving around a lot more than usual: Not at all - 0  Thoughts that you would be better off dead, or of hurting yourself in some way: Not at all - 0   PHQ-9 Adult Depression Screening Negative     PHQ-9 Difficulty Level Not difficult at all     PHQ-9 Severity No Depression         Future Appointments    Date/Time Provider Specialty Site   03/07/2017 12:45 PM DI [place]   Obstetrics/Gynecology [facility]     Signatures   Electronically signed by : Doris Alcaraz DO; Feb 28 2017 10:21PM EST (Author)

## 2018-01-13 NOTE — MISCELLANEOUS
Message   Date: 11 May 2017 12:15 PM EST, Recorded By: Airam Solares For: Karena Grayson, Self   Phone: [phone] (Home)   Reason: Medical Complaint   pt has been having abdominal pain and she has been having loose stools and blood in her stools    her PCP has ordered some GI tests, but an U/S can be done to eliminate a problem with the IUD  Larayne Chroman pt will schedule U/S appt           Active Problems    1  Asthma (493 90) (J45 909)   2  Blood in stool (578 1) (K92 1)   3  Dysmenorrhea (625 3) (N94 6)   4  Iron deficiency anemia (280 9) (D50 9)   5  IUD contraception (V45 51) (Z97 5)   6  Metrorrhagia (626 6) (N92 1)   7  Rectal bleeding (569 3) (K62 5)    Allergies    1  No Known Drug Allergies    2  Animal dander - Cats   3   Animal dander - Dogs    Signatures   Electronically signed by : Nina Daniel, ; May 11 2017 12:17PM EST                       (Author)

## 2018-01-14 VITALS
HEART RATE: 106 BPM | WEIGHT: 119 LBS | HEIGHT: 61 IN | SYSTOLIC BLOOD PRESSURE: 110 MMHG | DIASTOLIC BLOOD PRESSURE: 62 MMHG | OXYGEN SATURATION: 100 % | TEMPERATURE: 98 F | BODY MASS INDEX: 22.47 KG/M2 | RESPIRATION RATE: 16 BRPM

## 2018-01-14 VITALS
RESPIRATION RATE: 18 BRPM | DIASTOLIC BLOOD PRESSURE: 76 MMHG | WEIGHT: 133.6 LBS | HEIGHT: 61 IN | SYSTOLIC BLOOD PRESSURE: 110 MMHG | BODY MASS INDEX: 25.22 KG/M2 | HEART RATE: 76 BPM

## 2018-01-14 VITALS
HEIGHT: 61 IN | BODY MASS INDEX: 22.47 KG/M2 | SYSTOLIC BLOOD PRESSURE: 112 MMHG | DIASTOLIC BLOOD PRESSURE: 72 MMHG | WEIGHT: 119 LBS

## 2018-01-15 VITALS
BODY MASS INDEX: 22.09 KG/M2 | SYSTOLIC BLOOD PRESSURE: 126 MMHG | HEIGHT: 61 IN | DIASTOLIC BLOOD PRESSURE: 90 MMHG | WEIGHT: 117 LBS

## 2018-01-15 NOTE — PROGRESS NOTES
Chief Complaint  Pt ere for Toradol and Promethazine injections      Active Problems    1  Acute pharyngitis, unspecified etiology (462) (J02 9)   2  Asthma (493 90) (J45 909)   3  Atopic dermatitis, mild (691 8) (L20 9)   4  Blood in stool (578 1) (K92 1)   5  Dysmenorrhea (625 3) (N94 6)   6  Headache (784 0) (R51)   7  Iron deficiency anemia (280 9) (D50 9)   8  IUD contraception (V45 51) (Z97 5)   9  Metrorrhagia (626 6) (N92 1)   10  Nausea (787 02) (R11 0)   11  Rectal bleeding (569 3) (K62 5)    Current Meds   1  Azithromycin 250 MG Oral Tablet; TAKE 2 TABLETS ON DAY 1 THEN TAKE 1 TABLET A   DAY FOR 4 DAYS; Therapy: 67ZAT7028 to (Last Rx:19Jun2017) Ordered   2  Betamethasone Dipropionate 0 05 % External Cream; APPLY SPARINGLY TO   AFFECTED AREA(S) TWICE DAILY; Therapy: 78RRL9327 to (Last Rx:54Ygd0499) Ordered    Allergies    1  No Known Drug Allergies    2  Animal dander - Cats   3  Animal dander - Dogs    Plan  Headache, Nausea    · Ketorolac Tromethamine 60 MG/2ML Intramuscular Solution   · Promethazine HCl 50 MG/ML Injection Solution    Future Appointments    Date/Time Provider Specialty Site   08/17/2017 03:30 PM DI [place]   Hematology [facility]     Signatures   Electronically signed by : Hiral Hirsch; Jul 24 2017  3:25PM EST                       (Author)    Electronically signed by : Jaylyn Hawkins DO; Jul 24 2017  5:46PM EST                       (Author)

## 2018-01-15 NOTE — RESULT NOTES
Verified Results  (1) CBC/PLT/DIFF [id number] 04:46PM Josue Perdomo Order Number: [id number]     Test Name Result Flag Reference   WBC COUNT 5 52 Thousand/uL  4 31-10 16   RBC COUNT 4 73 Million/uL  3 81-5 12   HEMOGLOBIN 8 2 g/dL L 11 5-15 4   HEMATOCRIT 30 5 % L 34 8-46  1   MCV 65 fL L 82-98   MCH 17 3 pg L 26 8-34 3   MCHC 26 9 g/dL L 31 4-37 4   RDW 19 5 % H 11 6-15 1   MPV 10 0 fL  8 9-12 7   PLATELET COUNT 688 Thousands/uL H 149-390   nRBC AUTOMATED 0 /100 WBCs     NEUTROPHILS RELATIVE PERCENT 55 %  43-75   LYMPHOCYTES RELATIVE PERCENT 33 %  14-44   MONOCYTES RELATIVE PERCENT 8 %  4-12   EOSINOPHILS RELATIVE PERCENT 3 %  0-6   BASOPHILS RELATIVE PERCENT 1 %  0-1   NEUTROPHILS ABSOLUTE COUNT 2 98 Thousands/?L  1 85-7 62   LYMPHOCYTES ABSOLUTE COUNT 1 84 Thousands/?L  0 60-4 47   MONOCYTES ABSOLUTE COUNT 0 46 Thousand/?L  0 17-1 22   EOSINOPHILS ABSOLUTE COUNT 0 19 Thousand/?L  0 00-0 61   BASOPHILS ABSOLUTE COUNT 0 04 Thousands/?L  0 00-0 10   - Patient Instructions: This bloodwork is non-fasting  Please drink two glasses of water morning of bloodwork  - Patient Instructions: This bloodwork is non-fasting  Please drink two glasses of water morning of bloodwork  (1) COMPREHENSIVE METABOLIC PANEL [id number] 88:89CV Josue Perdomo Order Number: [id number]     Test Name Result Flag Reference   GLUCOSE,RANDM 89 mg/dL     If the patient is fasting, the ADA then defines impaired fasting glucose as > 100 mg/dL and diabetes as > or equal to 123 mg/dL     SODIUM 138 mmol/L  136-145   POTASSIUM 4 9 mmol/L  3 5-5 3   CHLORIDE 105 mmol/L  100-108   CARBON DIOXIDE 24 mmol/L  21-32   ANION GAP (CALC) 9 mmol/L  4-13   BLOOD UREA NITROGEN 14 mg/dL  5-25   CREATININE 0 83 mg/dL  0 60-1 30   Standardized to IDMS reference method   CALCIUM 8 9 mg/dL  8 3-10 1   BILI, TOTAL 0 32 mg/dL  0 20-1 00   ALK PHOSPHATAS 67 U/L     ALT (SGPT) 16 U/L  12-78   AST(SGOT) 14 U/L  5-45   ALBUMIN 3 8 g/dL 3 5-5 0   TOTAL PROTEIN 7 9 g/dL  6 4-8 2   eGFR Non-African American      >60 0 ml/min/1 73sq m   - Patient Instructions: This is a fasting blood test  Water, black tea or black coffee only after 9:00pm the night before test Drink 2 glasses of water the morning of test - Patient Instructions: This bloodwork is non-fasting  Please drink two glasses of   water morning of bloodwork  National Kidney Disease Education Program recommendations are as follows:  GFR calculation is accurate only with a steady state creatinine  Chronic Kidney disease less than 60 ml/min/1 73 sq  meters  Kidney failure less than 15 ml/min/1 73 sq  meters  (1) LIPID PANEL FASTING W DIRECT LDL REFLEX [id number] 04:46PM Zaria Stephenson Order Number: [id number]     Test Name Result Flag Reference   CHOLESTEROL 165 mg/dL     LDL CHOLESTEROL CALCULATED 102 mg/dL H 0-100   - Patient Instructions: This is a fasting blood test  Water, black tea or black coffee only after 9:00pm the night before test   Drink 2 glasses of water the morning of test     - Patient Instructions: This is a fasting blood test  Water, black tea or black coffee only after 9:00pm the night before test Drink 2 glasses of water the morning of test - Patient Instructions: This bloodwork is non-fasting  Please drink two glasses of   water morning of bloodwork    Triglyceride:         Normal              <150 mg/dl       Borderline High    150-199 mg/dl       High               200-499 mg/dl       Very High          >499 mg/dl  Cholesterol:         Desirable        <200 mg/dl      Borderline High  200-239 mg/dl      High             >239 mg/dl  HDL Cholesterol:        High    >59 mg/dL      Low     <41 mg/dL  LDL Cholesterol:        Optimal          <100 mg/dl        Near Optimal     100-129 mg/dl        Above Optimal          Borderline High   130-159 mg/dl          High              160-189 mg/dl          Very High        >189 mg/dl  LDL CALCULATED:    This screening LDL is a calculated result  It does not have the accuracy of the Direct Measured LDL in the monitoring of patients with hyperlipidemia and/or statin therapy  Direct Measure LDL (EAE612) must be ordered separately in these patients  TRIGLYCERIDES 80 mg/dL  <=150   Specimen collection should occur prior to N-Acetylcysteine or Metamizole administration due to the potential for falsely depressed results  HDL,DIRECT 47 mg/dL  40-60   Specimen collection should occur prior to Metamizole administration due to the potential for falsely depressed results  (1) TSH [id number] 04:46PM Tessie Rodriguez Order Number: [id number]     Test Name Result Flag Reference   TSH 1 440 uIU/mL  0 358-3 740   - Patient Instructions: This bloodwork is non-fasting  Please drink two glasses of water morning of bloodwork  - Patient Instructions: This is a fasting blood test  Water, black tea or black coffee only after 9:00pm the night before test Drink 2 glasses of water the morning of test - Patient Instructions: This bloodwork is non-fasting  Please drink two glasses of   water morning of bloodwork  Patients undergoing fluorescein dye angiography may retain small amounts of fluorescein in the body for 48-72 hours post procedure  Samples containing fluorescein can produce falsely depressed TSH values  If the patient had this procedure,a specimen should be resubmitted post fluorescein clearance  The recommended reference ranges for TSH during pregnancy are as follows:  First trimester 0 1 to 2 5 uIU/mL  Second trimester  0 2 to 3 0 uIU/mL  Third trimester 0 3 to 3 0 uIU/m     (1) [address] [place] [address] [id number] 04:46PM Tessie  Order Number: [id number]     Test Name Result Flag Reference   FOLLICLE STIMULATING HORMONE 7 6 mIU/mL     - Patient Instructions:  This is a fasting blood test  Water, black tea or black coffee only after 9:00pm the night before test Drink 2 glasses of water the morning of test - Patient Instructions: This bloodwork is non-fasting  Please drink two glasses of   water morning of bloodwork  FSH:  Menstruating Females:     Follicular Phase  9 2-72 1 mIU/mL    Mid-Cycle Phase   5 2-17 5 mIU/mL    Luteal Phase      1 7-9 5  mIU/mL  Postmenopausal Females    On Menopausal Hormone Therapy(HRT) 5 9-72 8   mIU/mL    Untreated                          12 7-132 2 mIU/mL

## 2018-01-16 NOTE — CONSULTS
Assessment    1  Rectal bleeding (569 3) (K62 5)   2  Blood in stool (578 1) (K92 1)    Plan  Blood in stool    · (1) BASIC METABOLIC PROFILE; Status:Active; Requested for:10May2017;    Perform:[facility]; HRV:68QDQ1569; Ordered; For:Blood in stool; Ordered By:Cullen Yost;   · (1) C  DIFFICILE TOXIN BY PCR; Status:Active; Requested for:10May2017;    Perform:[facility]; MISAEL:[id number]; Ordered; For:Blood in stool; Ordered By:Cullen Yost;   · (1) CBC/PLT/DIFF; Status:Active; Requested for:10May2017;    Perform:[facility]; JZB:18PZE6927; Ordered; For:Blood in stool; Ordered By:Cullen Yost;   · (1) HEPATIC FUNCTION PANEL; Status:Active; Requested for:10May2017;    Perform:[facility]; FFO:20MGG3396; Ordered; For:Blood in stool; Ordered By:Cullen Yost;   · (1) LACTOFERRIN, STOOL; Status:Active; Requested for:10May2017;    Perform:[facility]; GMR:88GKI2781; Ordered; For:Blood in stool; Ordered By:Cullen Yost;   · (1) OVA AND PARASITES EXAM; Status:Active; Requested for:10May2017;    Perform:[facility]; WMC:81OJV1682; Ordered; For:Blood in stool; Ordered By:Cullen Yost;   · (1) STOOL CULTURE; Source:Rectum; Status:Active; Requested for:10May2017;    Perform:[facility]; UQA:80RKQ5208; Ordered; For:Blood in stool; Ordered By:Cullen Yost;   · COLONOSCOPY (GI, SURG); Status:Active; Requested for:10May2017;    Perform:[facility]; BKN:[id number]; Last Updated By:Ofelia Jacobs; 5/10/2017 10:32:34 AM;Ordered; For:Blood in stool; Ordered By:Cullen Yost;    Discussion/Summary  Discussion Summary:   Rectal bleeding:   - Schedule colonoscopy to evaluate for inflammatory bowel disease with terminal ileum intubation   - Labs to rule out infectious diarrhea versus inflammatory bowel disease  Abdominal pain  - Dependent on the labs if patient has persistent symptoms, consider CT with IV contrast of abdominopelvic pelvis  - Follow-up after procedures  Counseling Documentation With Imm: The patient was counseled regarding  Chief Complaint  Chief Complaint Free Text Note Form: pt consult for blood in stool, diarrhea and abdominal pain      History of Present Illness  HPI: 27-year-old female presented with lower abdominal pain and blood in stool for a week  Patient reported having sudden onset of lower abdominal pain in the past week  Pain was not associated with food intake  She had loose stool initially and then a becomes a little formed but overall blood mixed with stool  He denies family history of inflammatory bowel disease  Patient has chronic anemia  She is being worked up for [employer] disease by hematology  Her hgb is around 8  she received iron  History Reviewed: The history was obtained today from the patient and I agree with the documented history  Review of Systems  Complete-Female GI Adult:   Constitutional: No fever, no chills, feels well, no tiredness, no recent weight gain or weight loss  Eyes: eyesight problems, but no eye pain, no dryness of the eyes, eyes not red, no purulent discharge from the eyes and no itching of the eyes  ENT: no complaints of earache, no loss of hearing, no nose bleeds, no nasal discharge, no sore throat, no hoarseness  Cardiovascular: No complaints of slow heart rate, no fast heart rate, no chest pain, no palpitations, no leg claudication, no lower extremity edema  Respiratory: No complaints of shortness of breath, no wheezing, no cough, no SOB on exertion, no orthopnea, no PND  Gastrointestinal: abdominal pain, diarrhea, abdominal swelling and bloating, but as noted in HPI, no nausea, no vomiting, no constipation, no blood in stools, no dysphagia, no heartburn, no pain on swallowing and no belching  Genitourinary: No complaints of dysuria, no incontinence, no pelvic pain, no dysmenorrhea, no vaginal discharge or bleeding     Musculoskeletal: No complaints of arthralgias, no myalgias, no joint swelling or stiffness, no limb pain or swelling  Integumentary: No complaints of skin rash or lesions, no itching, no skin wounds, no breast pain or lump  Neurological: No complaints of headache, no confusion, no convulsions, no numbness, no dizziness or fainting, no tingling, no limb weakness, no difficulty walking  Psychiatric: Not suicidal, no sleep disturbance, no anxiety or depression, no change in personality, no emotional problems  Endocrine: No complaints of proptosis, no hot flashes, no muscle weakness, no deepening of the voice, no feelings of weakness  Hematologic/Lymphatic: No complaints of swollen glands, no swollen glands in the neck, does not bleed easily, does not bruise easily  ROS Reviewed:   ROS reviewed  Active Problems    1  Asthma (493 90) (J45 909)   2  Dysmenorrhea (625 3) (N94 6)   3  Iron deficiency anemia (280 9) (D50 9)   4  IUD contraception (V45 51) (Z97 5)   5  Metrorrhagia (626 6) (N92 1)   6  Rectal bleeding (569 3) (K62 5)    Past Medical History    1  Denied: History of alcohol abuse   2  History of anemia (V12 3) (Z86 2)   3  History of asthma (V12 69) (Z87 09)   4  History of menorrhagia (V13 29) (Z87 42)   5  Denied: History of mental disorder   6  Denied: History of substance abuse  Active Problems And Past Medical History Reviewed: The active problems and past medical history were reviewed and updated today  Surgical History    1  Denied: History Of Prior Surgery  Surgical History Reviewed: The surgical history was reviewed and updated today  Family History  Mother    1  Family history of hypertension (V17 49) (Z82 49)  Father    2  Family history of cardiac disorder (V17 49) (Z82 49)   3  Family history of hypertension (V17 49) (Z82 49)  Family History    4  Denied: Family history of alcohol abuse   5  Denied: Family history of mental disorder   6  Denied: Family history of substance abuse  Family History Reviewed:    The family history was reviewed and updated today  Social History    · Always uses seat belt   · Employed   · Exercises daily (V49 89) (Z78 9)   · Has no children   · Denied: History of domestic violence   · IUD contraception (V45 51) (Z97 5)   · Lives in apartment   · Lives with parents   · Never a smoker   · No advance directives (V49 89) (Z78 9)   · No caffeine use   · Not current smoker (V49 89) (Z78 9)   · Primary language is English   · Denied: History of Yazdanism status   · Single   · Social alcohol use (Z78 9)   · Supportive and safe  Social History Reviewed: The social history was reviewed and updated today  Current Meds   1  Flovent  MCG/ACT Inhalation Aerosol; INHALE 2 PUFFS TWICE DAILY; Therapy: 00KJE4990 to (Celina Harrison)  Requested for: 25QFS4695; Last   Rx:03Mar2017 Ordered   2  MethylPREDNISolone 4 MG Oral Tablet Therapy Pack; take as directed; Therapy: 25FTH1953 to (Evaluate:68Haq3526); Last Rx:15Jsj8693 Ordered   3  Sprintec 28 0 25-35 MG-MCG Oral Tablet; TAKE 1 TABLET DAILY AS DIRECTED; Therapy: 57WTT4524 to (Evaluate:93Fzg1268)  Requested for: 57Jok2308; Last   Rx:10Twv1541 Ordered  Medication List Reviewed: The medication list was reviewed and updated today  Allergies    1  No Known Drug Allergies    2  Animal dander - Cats   3  Animal dander - Dogs    Vitals  Vital Signs    Recorded: 53HWJ5766 09:34AM   Temperature 97 7 F, Tympanic   Heart Rate 93   Systolic 066, LUE, Sitting   Diastolic 78, LUE, Sitting   Height 5 ft 1 in   Weight 121 lb 6 0 oz   BMI Calculated 22 93   BSA Calculated 1 53   O2 Saturation 99, RA     Physical Exam    Constitutional   General appearance: No acute distress, well appearing and well nourished  Eyes   Conjunctiva and lids: No swelling, erythema or discharge  Ears, Nose, Mouth, and Throat   Oropharynx: Normal with no erythema, edema, exudate or lesions      Pulmonary   Respiratory effort: No increased work of breathing or signs of respiratory distress  Cardiovascular   Examination of extremities for edema and/or varicosities: Normal     Abdomen   Abdomen: Non-tender, no masses  Musculoskeletal   Gait and station: Normal     Psychiatric   Orientation to person, place, and time: Normal          Future Appointments    Date/Time Provider Specialty Site   08/17/2017 03:30 PM DI Connors  Hematology [facility]cology [facility]   06/21/2017 07:45 AM DI Perez   Obstetrics/Gynecology [facility]   05/22/2017 02:00 PM Tom Amaral MD Gastroenterology Adult ST [address] [place] [address] ENDOSCOPY     Signatures   Electronically signed by : Jake Burgess MD; May 10 2017 11:46AM EST                       (Author)

## 2018-01-16 NOTE — MISCELLANEOUS
Message   Recorded as Task   Date: 03/10/2017 07:47 AM, Created By: Ethan Avelar   Task Name: Miscellaneous   Assigned To: Carol Ann George   Regarding Patient: Wayne Zheng, Status: In Progress   Comment:    PaulinaKirk - 10 Mar 2017 7:47 AM     TASK CREATED  This test is low, possibly suggestive of von Willebrand's disease  Recommend referral to hematology  Carol Ann George - 10 Mar 2017 10:12 AM     TASK IN PROGRESS   Carol Ann George - 10 Mar 2017 10:23 AM     TASK REASSIGNED: Previously Assigned To ZURI CHAIREZ [facility] Nursing,Team  please have your staff call this pt for an appt  Americo Munroe - 10 Mar 2017 1:15 PM     TASK REASSIGNED: Previously Assigned To Erendira Quintero - 16 Mar 2017 12:26 PM     TASK REASSIGNED: Previously Assigned To Nafisa Guerrero  this is a new patient-thank you   DereckRamya caputo - 16 Mar 2017 2:56 PM     TASK REPLIED TO: Previously Assigned To Tejas Archer  scheduled pt with Dr Susie Howell on 3/30/17  Active Problems    1  Asthma (493 90) (J45 909)   2  Dysmenorrhea (625 3) (N94 6)   3  Encounter for insertion of mirena IUD (V25 11) (Z30 430)   4  Iron deficiency anemia (280 9) (D50 9)   5  Menorrhagia (626 2) (N92 0)   6  Potential exposure to STD (V01 6) (Z20 2)    Current Meds   1  Flovent  MCG/ACT Inhalation Aerosol; INHALE 2 PUFFS TWICE DAILY; Therapy: 45NAM6646 to (Abelino Yee)  Requested for: 57XHU3623; Last   Rx:03Mar2017 Ordered   2  Sprintec 28 0 25-35 MG-MCG Oral Tablet; TAKE 1 TABLET DAILY AS DIRECTED; Therapy: 02PZY0376 to (Evaluate:76Pve5041)  Requested for: 59BFK8247; Last   Rx:87Nzj0490 Ordered   3  Sprintec 28 0 25-35 MG-MCG Oral Tablet; Take as directed Recorded    Allergies    1  No Known Drug Allergies    2  Animal dander - Cats   3   Animal dander - Dogs    Signatures   Electronically signed by : Dada Giordano, ; Mar 17 2017  7:37AM EST                       (Author)

## 2018-01-17 NOTE — MISCELLANEOUS
Message  Message Free Text Note Form: pt related abdominal pain and blood in stools several times throughout day 5/9/17  instructed he to call yane katz for recommendation   They ordered lab and requested gi eval   I contacted gi and arranged eval for 5/10/17      Signatures   Electronically signed by : Jonathan Hassan DO; May 10 2017  8:28AM EST                       (Author)

## 2018-01-22 VITALS
BODY MASS INDEX: 22.92 KG/M2 | DIASTOLIC BLOOD PRESSURE: 78 MMHG | WEIGHT: 121.38 LBS | HEIGHT: 61 IN | TEMPERATURE: 97.7 F | HEART RATE: 93 BPM | SYSTOLIC BLOOD PRESSURE: 102 MMHG | OXYGEN SATURATION: 99 %

## 2018-01-23 VITALS
BODY MASS INDEX: 25.11 KG/M2 | RESPIRATION RATE: 16 BRPM | DIASTOLIC BLOOD PRESSURE: 90 MMHG | HEART RATE: 96 BPM | TEMPERATURE: 98.4 F | WEIGHT: 133 LBS | SYSTOLIC BLOOD PRESSURE: 108 MMHG | HEIGHT: 61 IN

## 2018-01-23 VITALS
SYSTOLIC BLOOD PRESSURE: 110 MMHG | RESPIRATION RATE: 16 BRPM | BODY MASS INDEX: 24.55 KG/M2 | WEIGHT: 130 LBS | HEIGHT: 61 IN | DIASTOLIC BLOOD PRESSURE: 76 MMHG | HEART RATE: 84 BPM

## 2018-01-23 NOTE — MISCELLANEOUS
Message  Return to work or school:   Fco Aguero is under my professional care  She was seen in my office on 12/29/17   She is able to return to work on  1/2/16            Future Appointments    Signatures   Electronically signed by :  Simon Gann MD; Dec 29 2017  8:23AM EST                       (Author)

## 2018-01-23 NOTE — MISCELLANEOUS
Message  patient describes that with approximately a few days of taking Effexor she describes panic attacks, worsened anxiety, elevated heart rate as high as 130s, chest pain and strange dreams  These symptoms occurred between 2-4 hours after taking Effexor  She trials this for several days and each times was the same  I recommend to stop taking Effexor immediately and will switch to Zoloft        Plan  Anxiety    · Venlafaxine HCl ER 37 5 MG Oral Capsule Extended Release 24 Hour   · Sertraline HCl - 50 MG Oral Tablet; TAKE 1 TABLET DAILY AS DIRECTED    Signatures   Electronically signed by : Suzie Henry; Dec 12 2017 10:11AM EST                       (Author)

## 2018-01-23 NOTE — MISCELLANEOUS
Message  patient continues with symptoms of anxiety  We switch to zoloft 50mg  With and without taking the medication patient still experiencing chest pain and tachycardia  She reports not feeling as overwhelmed as before and feels that her heart rate is making her more anxious  She states she had an episode where her heart rate was elevated in 150s for a while and it took longer period of time to bring it down into the lower 100s  She does not have headache or changes in vision  Denies syncopal episodes  I would liker her to stop the Zoloft and make an appointment with cardiology as soon as possible as well as be evaluated for a holter monitor  If symptoms worsen she should be seen in the ER        Signatures   Electronically signed by : Dwight Quevedo; Dec 14 2017 12:45PM EST                       (Author)

## 2018-01-24 VITALS
RESPIRATION RATE: 16 BRPM | TEMPERATURE: 98.4 F | HEIGHT: 61 IN | WEIGHT: 131.13 LBS | SYSTOLIC BLOOD PRESSURE: 116 MMHG | BODY MASS INDEX: 24.76 KG/M2 | DIASTOLIC BLOOD PRESSURE: 90 MMHG | HEART RATE: 96 BPM

## 2018-01-24 VITALS — SYSTOLIC BLOOD PRESSURE: 120 MMHG | DIASTOLIC BLOOD PRESSURE: 74 MMHG

## 2018-01-29 ENCOUNTER — APPOINTMENT (OUTPATIENT)
Dept: LAB | Facility: HOSPITAL | Age: 23
End: 2018-01-29
Payer: COMMERCIAL

## 2018-01-29 DIAGNOSIS — N39.0 URINARY TRACT INFECTION WITH HEMATURIA, SITE UNSPECIFIED: Primary | ICD-10-CM

## 2018-01-29 DIAGNOSIS — R39.9 UTI SYMPTOMS: Primary | ICD-10-CM

## 2018-01-29 DIAGNOSIS — R31.9 URINARY TRACT INFECTION WITH HEMATURIA, SITE UNSPECIFIED: Primary | ICD-10-CM

## 2018-01-29 LAB
SL AMB  POCT GLUCOSE, UA: ABNORMAL
SL AMB LEUKOCYTE ESTERASE,UA: ABNORMAL
SL AMB POCT BILIRUBIN,UA: 1
SL AMB POCT BLOOD,UA: 50
SL AMB POCT CLARITY,UA: ABNORMAL
SL AMB POCT COLOR,UA: ABNORMAL
SL AMB POCT KETONES,UA: ABNORMAL
SL AMB POCT NITRITE,UA: ABNORMAL
SL AMB POCT PH,UA: 5
SL AMB POCT SPECIFIC GRAVITY,UA: 1.03
SL AMB POCT TOTAL PROTEIN: 100
UROBILINOGEN UR STRIP-ACNC: 0.2

## 2018-01-29 PROCEDURE — 87086 URINE CULTURE/COLONY COUNT: CPT

## 2018-01-29 PROCEDURE — 81002 URINALYSIS NONAUTO W/O SCOPE: CPT

## 2018-01-29 RX ORDER — CIPROFLOXACIN 500 MG/1
500 TABLET, FILM COATED ORAL EVERY 12 HOURS SCHEDULED
Qty: 14 TABLET | Refills: 0 | Status: SHIPPED | OUTPATIENT
Start: 2018-01-29 | End: 2018-02-05

## 2018-01-31 LAB — BACTERIA UR CULT: NORMAL

## 2018-02-05 ENCOUNTER — OFFICE VISIT (OUTPATIENT)
Dept: FAMILY MEDICINE CLINIC | Facility: CLINIC | Age: 23
End: 2018-02-05
Payer: COMMERCIAL

## 2018-02-05 VITALS
DIASTOLIC BLOOD PRESSURE: 100 MMHG | RESPIRATION RATE: 16 BRPM | TEMPERATURE: 98.1 F | HEART RATE: 98 BPM | BODY MASS INDEX: 23.48 KG/M2 | SYSTOLIC BLOOD PRESSURE: 132 MMHG | WEIGHT: 132.5 LBS | HEIGHT: 63 IN

## 2018-02-05 DIAGNOSIS — J20.9 ACUTE BRONCHITIS, UNSPECIFIED ORGANISM: Primary | ICD-10-CM

## 2018-02-05 DIAGNOSIS — J01.00 ACUTE NON-RECURRENT MAXILLARY SINUSITIS: ICD-10-CM

## 2018-02-05 PROBLEM — L70.0 ACNE VULGARIS: Status: ACTIVE | Noted: 2017-09-26

## 2018-02-05 PROBLEM — J45.20 MILD INTERMITTENT ASTHMA WITHOUT COMPLICATION: Status: ACTIVE | Noted: 2017-12-29

## 2018-02-05 PROBLEM — N94.6 DYSMENORRHEA: Status: ACTIVE | Noted: 2017-02-24

## 2018-02-05 PROBLEM — R00.0 TACHYCARDIA: Status: ACTIVE | Noted: 2017-12-18

## 2018-02-05 PROBLEM — R07.89 ATYPICAL CHEST PAIN: Status: ACTIVE | Noted: 2017-12-18

## 2018-02-05 PROBLEM — R51.9 HEADACHE: Status: ACTIVE | Noted: 2017-07-24

## 2018-02-05 PROBLEM — L20.9 ATOPIC DERMATITIS, MILD: Status: ACTIVE | Noted: 2017-05-25

## 2018-02-05 PROBLEM — D50.9 IRON DEFICIENCY ANEMIA: Status: ACTIVE | Noted: 2017-01-12

## 2018-02-05 PROBLEM — F41.9 ANXIETY: Status: ACTIVE | Noted: 2017-11-15

## 2018-02-05 PROCEDURE — 99213 OFFICE O/P EST LOW 20 MIN: CPT | Performed by: NURSE PRACTITIONER

## 2018-02-05 RX ORDER — BETAMETHASONE DIPROPIONATE 0.5 MG/G
1 CREAM TOPICAL 2 TIMES DAILY
COMMUNITY
Start: 2017-05-25 | End: 2018-09-25

## 2018-02-05 RX ORDER — PROMETHAZINE HYDROCHLORIDE AND CODEINE PHOSPHATE 6.25; 1 MG/5ML; MG/5ML
5 SYRUP ORAL EVERY 4 HOURS PRN
Qty: 120 ML | Refills: 0 | Status: SHIPPED | OUTPATIENT
Start: 2018-02-05 | End: 2018-09-25

## 2018-02-05 RX ORDER — VENLAFAXINE HYDROCHLORIDE 37.5 MG/1
1 CAPSULE, EXTENDED RELEASE ORAL DAILY
COMMUNITY
Start: 2017-11-15 | End: 2018-09-25

## 2018-02-05 RX ORDER — ALBUTEROL SULFATE 90 UG/1
2 AEROSOL, METERED RESPIRATORY (INHALATION) EVERY 4 HOURS PRN
Qty: 1 INHALER | Refills: 0 | Status: SHIPPED | OUTPATIENT
Start: 2018-02-05 | End: 2018-09-25

## 2018-02-05 RX ORDER — BENZONATATE 200 MG/1
200 CAPSULE ORAL 3 TIMES DAILY PRN
Qty: 20 CAPSULE | Refills: 0 | Status: SHIPPED | OUTPATIENT
Start: 2018-02-05 | End: 2018-09-25

## 2018-02-05 RX ORDER — IBUPROFEN 600 MG/1
600 TABLET ORAL EVERY 6 HOURS PRN
Qty: 30 TABLET | Refills: 0 | Status: SHIPPED | OUTPATIENT
Start: 2018-02-05

## 2018-02-05 RX ORDER — MONTELUKAST SODIUM 10 MG/1
1 TABLET ORAL DAILY
COMMUNITY
Start: 2018-01-12 | End: 2018-09-25

## 2018-02-05 RX ORDER — BUDESONIDE AND FORMOTEROL FUMARATE DIHYDRATE 160; 4.5 UG/1; UG/1
2 AEROSOL RESPIRATORY (INHALATION) 2 TIMES DAILY
COMMUNITY
Start: 2017-12-29 | End: 2018-09-25

## 2018-02-05 RX ORDER — MINOCYCLINE HYDROCHLORIDE 100 MG/1
1 CAPSULE ORAL 2 TIMES DAILY
COMMUNITY
Start: 2017-09-26 | End: 2018-09-25

## 2018-02-05 RX ORDER — LEVOFLOXACIN 500 MG/1
500 TABLET, FILM COATED ORAL EVERY 24 HOURS
Qty: 7 TABLET | Refills: 0 | Status: SHIPPED | OUTPATIENT
Start: 2018-02-05 | End: 2018-02-12

## 2018-02-05 NOTE — LETTER
February 5, 2018     Patient: Rubens Sam   YOB: 1995   Date of Visit: 2/5/2018       To Whom it May Concern:    Rubens Sam is under my professional care  She was seen in my office on 2/5/2018  She may return to work on 2/7/2018  If you have any questions or concerns, please don't hesitate to call           Sincerely,          MAGALIS Claros        CC: No Recipients

## 2018-02-05 NOTE — PROGRESS NOTES
Chief Complaint   Patient presents with    Cold Like Symptoms     Cough, sore throat, nausea, stomach pain, loss of voice, sinus pressure, ear ache, headache       Assessment/Plan:    Mild intermittent asthma without complication  Uses Flovent daily  Diagnoses and all orders for this visit:    Acute bronchitis, unspecified organism  -     levofloxacin (LEVAQUIN) 500 mg tablet; Take 1 tablet (500 mg total) by mouth every 24 hours for 7 days  -     albuterol (VENTOLIN HFA) 90 mcg/act inhaler; Inhale 2 puffs every 4 (four) hours as needed for wheezing or shortness of breath (chest tightness)  -     promethazine-codeine (PHENERGAN WITH CODEINE) 6 25-10 mg/5 mL syrup; Take 5 mL by mouth every 4 (four) hours as needed for cough  -     benzonatate (TESSALON) 200 MG capsule; Take 1 capsule (200 mg total) by mouth 3 (three) times a day as needed for cough    Acute non-recurrent maxillary sinusitis  Comments:  use motrin for pain relief  Orders:  -     levofloxacin (LEVAQUIN) 500 mg tablet; Take 1 tablet (500 mg total) by mouth every 24 hours for 7 days  -     ibuprofen (MOTRIN) 600 mg tablet; Take 1 tablet (600 mg total) by mouth every 6 (six) hours as needed for mild pain (ear aches)          Subjective:      Patient ID: Yassine Westbrook is a 25 y o  female  URI    This is a new problem  The current episode started in the past 7 days (5 days)  The problem has been gradually worsening  Associated symptoms include congestion, coughing, ear pain, headaches, rhinorrhea, sinus pain, a sore throat and vomiting  She has tried nothing for the symptoms  The treatment provided no relief  The following portions of the patient's history were reviewed and updated as appropriate: allergies, current medications, past family history, past medical history, past social history, past surgical history and problem list     Review of Systems   Constitutional: Positive for chills, fatigue and fever     HENT: Positive for congestion, ear pain, rhinorrhea, sinus pain and sore throat  Respiratory: Positive for cough, chest tightness and shortness of breath  Gastrointestinal: Positive for vomiting  Musculoskeletal: Positive for myalgias  Neurological: Positive for headaches  Objective:     Physical Exam   Constitutional: She appears well-developed and well-nourished  She appears ill  HENT:   Right Ear: A middle ear effusion (clear fluid) is present  Left Ear: A middle ear effusion (clear fluid) is present  Nose: Mucosal edema present  Mouth/Throat: No oropharyngeal exudate or posterior oropharyngeal erythema  Cardiovascular: Normal rate, regular rhythm, S1 normal and S2 normal     Pulmonary/Chest: She has wheezes in the right middle field, the left upper field and the left middle field  She has no rhonchi  Lymphadenopathy:     She has cervical adenopathy  Right cervical: Superficial cervical adenopathy present  Left cervical: Superficial cervical adenopathy present

## 2018-02-08 ENCOUNTER — HOSPITAL ENCOUNTER (OUTPATIENT)
Dept: NON INVASIVE DIAGNOSTICS | Facility: HOSPITAL | Age: 23
Discharge: HOME | End: 2018-02-08
Attending: INTERNAL MEDICINE
Payer: COMMERCIAL

## 2018-02-08 DIAGNOSIS — R07.89 OTHER CHEST PAIN: ICD-10-CM

## 2018-02-08 PROCEDURE — 93225 XTRNL ECG REC<48 HRS REC: CPT

## 2018-02-08 PROCEDURE — 93018 CV STRESS TEST I&R ONLY: CPT | Performed by: INTERNAL MEDICINE

## 2018-02-08 PROCEDURE — 93017 CV STRESS TEST TRACING ONLY: CPT

## 2018-02-08 PROCEDURE — 93306 TTE W/DOPPLER COMPLETE: CPT | Performed by: INTERNAL MEDICINE

## 2018-02-08 PROCEDURE — 93306 TTE W/DOPPLER COMPLETE: CPT

## 2018-02-08 PROCEDURE — 93016 CV STRESS TEST SUPVJ ONLY: CPT | Performed by: INTERNAL MEDICINE

## 2018-02-08 PROCEDURE — 93226 XTRNL ECG REC<48 HR SCAN A/R: CPT

## 2018-02-09 LAB
ARRHY DURING EX: NORMAL
CHEST PAIN STATEMENT: NORMAL
MAX DIASTOLIC BP: 68 MMHG
MAX HEART RATE: 173 BPM
MAX PREDICTED HEART RATE: 198 BPM
MAX. SYSTOLIC BP: 149 MMHG
PROTOCOL NAME: NORMAL
TARGET HR FORMULA: NORMAL
TEST INDICATION: NORMAL
TIME IN EXERCISE PHASE: NORMAL

## 2018-02-16 DIAGNOSIS — R30.0 DYSURIA: Primary | ICD-10-CM

## 2018-02-16 RX ORDER — CIPROFLOXACIN 500 MG/1
500 TABLET, FILM COATED ORAL EVERY 12 HOURS SCHEDULED
Qty: 10 TABLET | Refills: 0 | Status: SHIPPED | OUTPATIENT
Start: 2018-02-16 | End: 2018-02-21

## 2018-02-17 PROCEDURE — 93227 XTRNL ECG REC<48 HR R&I: CPT | Performed by: INTERNAL MEDICINE

## 2018-02-21 DIAGNOSIS — D50.9 IRON DEFICIENCY ANEMIA, UNSPECIFIED IRON DEFICIENCY ANEMIA TYPE: Primary | ICD-10-CM

## 2018-03-12 DIAGNOSIS — F41.9 ANXIETY: Primary | ICD-10-CM

## 2018-03-12 RX ORDER — BUSPIRONE HYDROCHLORIDE 5 MG/1
5 TABLET ORAL 2 TIMES DAILY
Qty: 60 TABLET | Refills: 0 | Status: SHIPPED | OUTPATIENT
Start: 2018-03-12 | End: 2018-09-25

## 2018-03-12 NOTE — PROGRESS NOTES
Effexor giving patient worsened anxiety with tachycardia/ palpitations, and severe nausea  She has tried taking it for 2 weeks straight  Symptoms not resolved  Since she stopped taking it symptoms resolved

## 2018-03-28 ENCOUNTER — LAB (OUTPATIENT)
Dept: LAB | Facility: HOSPITAL | Age: 23
End: 2018-03-28
Payer: COMMERCIAL

## 2018-03-28 ENCOUNTER — OFFICE VISIT (OUTPATIENT)
Dept: OBGYN CLINIC | Facility: CLINIC | Age: 23
End: 2018-03-28
Payer: COMMERCIAL

## 2018-03-28 VITALS
BODY MASS INDEX: 25.9 KG/M2 | WEIGHT: 137.2 LBS | SYSTOLIC BLOOD PRESSURE: 110 MMHG | HEIGHT: 61 IN | DIASTOLIC BLOOD PRESSURE: 60 MMHG

## 2018-03-28 DIAGNOSIS — D50.9 IRON DEFICIENCY ANEMIA, UNSPECIFIED IRON DEFICIENCY ANEMIA TYPE: ICD-10-CM

## 2018-03-28 DIAGNOSIS — R10.2 PELVIC PAIN: Primary | ICD-10-CM

## 2018-03-28 LAB
C TRACH RRNA SPEC QL PROBE: NEGATIVE
N GONORRHOEA RRNA SPEC QL PROBE: NEGATIVE

## 2018-03-28 PROCEDURE — 36415 COLL VENOUS BLD VENIPUNCTURE: CPT

## 2018-03-28 PROCEDURE — 85240 CLOT FACTOR VIII AHG 1 STAGE: CPT

## 2018-03-28 PROCEDURE — 85246 CLOT FACTOR VIII VW ANTIGEN: CPT

## 2018-03-28 PROCEDURE — 85245 CLOT FACTOR VIII VW RISTOCTN: CPT

## 2018-03-28 PROCEDURE — 99214 OFFICE O/P EST MOD 30 MIN: CPT | Performed by: OBSTETRICS & GYNECOLOGY

## 2018-03-28 NOTE — PROGRESS NOTES
Geoffrey Berger is here today originally to have her IUD removed  She was under the belief that she should have a cycle every month and that the longer she had the IUD, the worse her chances for getting pregnant would become  She also has noted pelvic pain which she believed was due to the IUD  Historically the IUD has been in place to help with her menorrhagia as she has mild von Willebrand's and had anemia down to a 8 4  The IUD is working well for her with respect to the fact that her heavy bleeding has been rectified and her hemoglobin is now 14  We spoke a great deal of how many of her symptoms could be caused by many other entities besides the IUD  After much discussion the patient preferred to 1st evaluate for other sources of discomfort prior to removal of the IUD which is working well for her  Physical exam:    Vulvar tissues are without swelling, erythema, lesions, or gland involvement  The vagina does show a nondescript discharge for which a 1 swab culture of the vagina and cervix are obtained  The IUD string is visible from a normal appearing cervix  Bimanual exam shows no cervical motion tenderness, anteverted uterus of normal size, no adnexal masses or tenderness  Assessment/plan:    Pelvic pain, etiology unknown, await results of culture    If negative will proceed with in office ultrasound to rule out ovarian cyst

## 2018-03-29 LAB
FACT XIIIA PPP-ACNC: 116 % (ref 57–163)
VWF:RCO ACT/NOR PPP PL AGG: 84 % (ref 50–200)

## 2018-03-30 ENCOUNTER — TELEPHONE (OUTPATIENT)
Dept: HEMATOLOGY ONCOLOGY | Facility: CLINIC | Age: 23
End: 2018-03-30

## 2018-03-30 NOTE — TELEPHONE ENCOUNTER
Patient is questioning whether she still has the blood disorder she saw dr Marley Salvador for in March 2017  She states she recently had labs done at [address] [place] [address] and her gyn said the levels were ok  Please give her a call to clarify/explain the von willebrand disease    [phone]

## 2018-04-02 DIAGNOSIS — N76.0 VAGINAL INFECTION: ICD-10-CM

## 2018-04-02 DIAGNOSIS — B37.3 YEAST VAGINITIS: Primary | ICD-10-CM

## 2018-04-02 RX ORDER — FLUCONAZOLE 150 MG/1
150 TABLET ORAL ONCE
Qty: 1 TABLET | Refills: 0 | Status: SHIPPED | OUTPATIENT
Start: 2018-04-02 | End: 2018-04-02

## 2018-04-02 RX ORDER — METRONIDAZOLE 7.5 MG/G
1 GEL VAGINAL DAILY
Qty: 70 G | Refills: 0 | Status: SHIPPED | OUTPATIENT
Start: 2018-04-02 | End: 2018-04-07

## 2018-04-03 LAB — FACT VIII AG ACT/NOR PPP IA: 88 %

## 2018-04-04 ENCOUNTER — TELEPHONE (OUTPATIENT)
Dept: HEMATOLOGY ONCOLOGY | Facility: CLINIC | Age: 23
End: 2018-04-04

## 2018-04-05 ENCOUNTER — TELEPHONE (OUTPATIENT)
Dept: OBGYN CLINIC | Facility: CLINIC | Age: 23
End: 2018-04-05

## 2018-04-05 ENCOUNTER — TELEPHONE (OUTPATIENT)
Dept: HEMATOLOGY ONCOLOGY | Facility: CLINIC | Age: 23
End: 2018-04-05

## 2018-04-05 NOTE — TELEPHONE ENCOUNTER
Says she got the message that her lab work was normal  She said she had already known that  Her question was "do I still have the blood disorder?" The reason she wants to know is that she wants to discontinue the IUD that she has--and which makes her not have her periods  Her doctor does not want this if she still has the blood disorder  Please call

## 2018-04-06 NOTE — TELEPHONE ENCOUNTER
Spoke to dr broderick who requests the pt to be seen in the office  I called the pt and lmom to call back and schedule an appt

## 2018-04-06 NOTE — TELEPHONE ENCOUNTER
I did not get a chance to discuss this with Dr Darrell Rabago yesterday when I was in the office when him - can you check with him about this and call the patient back    Thank you

## 2018-04-09 ENCOUNTER — TELEPHONE (OUTPATIENT)
Dept: OBGYN CLINIC | Facility: CLINIC | Age: 23
End: 2018-04-09

## 2018-04-17 ENCOUNTER — TELEPHONE (OUTPATIENT)
Dept: OBGYN CLINIC | Facility: CLINIC | Age: 23
End: 2018-04-17

## 2018-04-17 ENCOUNTER — OFFICE VISIT (OUTPATIENT)
Dept: OBGYN CLINIC | Facility: CLINIC | Age: 23
End: 2018-04-17
Payer: COMMERCIAL

## 2018-04-17 ENCOUNTER — ROUTINE PRENATAL (OUTPATIENT)
Dept: OBGYN CLINIC | Facility: CLINIC | Age: 23
End: 2018-04-17
Payer: COMMERCIAL

## 2018-04-17 VITALS
HEIGHT: 61 IN | SYSTOLIC BLOOD PRESSURE: 112 MMHG | WEIGHT: 136 LBS | BODY MASS INDEX: 25.68 KG/M2 | DIASTOLIC BLOOD PRESSURE: 70 MMHG

## 2018-04-17 DIAGNOSIS — R10.2 PELVIC PAIN: ICD-10-CM

## 2018-04-17 DIAGNOSIS — N92.6 IRREGULAR MENSES: ICD-10-CM

## 2018-04-17 DIAGNOSIS — Z11.3 SCREEN FOR STD (SEXUALLY TRANSMITTED DISEASE): Primary | ICD-10-CM

## 2018-04-17 DIAGNOSIS — B96.89 BACTERIAL VAGINOSIS: ICD-10-CM

## 2018-04-17 DIAGNOSIS — B37.3 VAGINAL CANDIDIASIS: ICD-10-CM

## 2018-04-17 DIAGNOSIS — N94.6 DYSMENORRHEA: ICD-10-CM

## 2018-04-17 DIAGNOSIS — N94.10 DYSPAREUNIA IN FEMALE: ICD-10-CM

## 2018-04-17 DIAGNOSIS — N76.0 BACTERIAL VAGINOSIS: ICD-10-CM

## 2018-04-17 LAB — SL AMB POCT WET MOUNT: 5

## 2018-04-17 PROCEDURE — 87491 CHLMYD TRACH DNA AMP PROBE: CPT | Performed by: OBSTETRICS & GYNECOLOGY

## 2018-04-17 PROCEDURE — 87210 SMEAR WET MOUNT SALINE/INK: CPT | Performed by: OBSTETRICS & GYNECOLOGY

## 2018-04-17 PROCEDURE — 76830 TRANSVAGINAL US NON-OB: CPT | Performed by: OBSTETRICS & GYNECOLOGY

## 2018-04-17 PROCEDURE — 87591 N.GONORRHOEAE DNA AMP PROB: CPT | Performed by: OBSTETRICS & GYNECOLOGY

## 2018-04-17 PROCEDURE — 99215 OFFICE O/P EST HI 40 MIN: CPT | Performed by: OBSTETRICS & GYNECOLOGY

## 2018-04-17 RX ORDER — METRONIDAZOLE 500 MG/1
500 TABLET ORAL 2 TIMES DAILY
Qty: 14 TABLET | Refills: 0 | Status: SHIPPED | OUTPATIENT
Start: 2018-04-17 | End: 2018-04-24

## 2018-04-17 RX ORDER — FLUCONAZOLE 150 MG/1
150 TABLET ORAL ONCE
Qty: 2 TABLET | Refills: 0 | Status: SHIPPED | OUTPATIENT
Start: 2018-04-17 | End: 2018-04-17

## 2018-04-17 NOTE — PROGRESS NOTES
AMB US Pelvic Non OB  Date/Time: 4/17/2018 1:41 PM  Performed by: Manoj Cha  Authorized by: Amy Parker     Procedure details:     Indications: non-obstetric abdominal pain      Technique:  Transvaginal US, Non-OB    Position: lithotomy exam    Uterine findings:     Diameter (mm):  35    Length (mm):  78    Width (mm):  50    Endometrial stripe: identified      Endometrium thickness (mm):  6 1  Left ovary findings:     Left ovary:  Visualized    Diameter (mm):  19 3    Length (mm):  37 1    Width (mm):  26 2  Right ovary findings:     Right ovary:  Visualized    Diameter (mm):  21 6    Length (mm):  34 2    Width (mm):  24 9  Other findings:     Free pelvic fluid: not identified      Free peritoneal fluid: not identified    Post-Procedure Details:     Impression:  Anteverted uterus demonstrates an IUD in good position within the endometrium  The uterus and bilateral ovaries otherwise appears within normal limits    No free fluid  Tolerance:   Tolerated well, no immediate complications    Complications: no complications

## 2018-04-17 NOTE — PROGRESS NOTES
Assessment/Plan:  1  Pelvic pain-unclear etiology  Patient has been with her partner for about 8 months time  GC/chlamydia testing was done today we will inform the patient of the findings  Ultrasound was unremarkable  She will take ibuprofen or Tylenol as needed  2   Mirena IUD-in good position  IUD string seen at length of 1 5 cm   3   Dyspareunia-noted since her new partner  She did note spotting 1 time 2  GC/chlamydia was done today  She will continue to watch for symptoms  4   Mixed vaginitis-noted on exam today  Electronic prescription for metronidazole 500 mg tablets b i d  for 7 days was sent a local pharmacy along with fluconazole 150 mg p o  x1 with repeat in 1 week time  She will call or return with any persistent symptoms  Of note, she was diagnosed with bacterial vaginosis by Dr Altaf Moreira a few weeks ago but did not take Metrogel vaginal because it was too expensive  She was strongly encouraged to take the oral medication for resolution as this could be causing her symptoms  5   Long history of menorrhagia with anemia-patient was strongly considering removal of [employer]rena [employer]  However, she has had severe menses since menarche age 6 with hemoglobin down to 6 after Depo-Provera use requiring blood transfusion on 2 separate occasions  Strongly, strongly, strongly encouraged her to not take the IUD out unless she is going to attempt pregnancy  This discussion when on for some time but the patient agrees and she will even at this time  She will talk with her partner and when she wishes to have a baby we will proceed with removal of the IUD  6   History of anemia-most recent hemoglobin was normal   7   Possible von Willebrand's disorder-recent testing appeared to be normal   She will follow up with Hematology in the near future regarding this issue  8   Other-patient was on Sprintec previously but did not take it regularly  She had nausea/vomiting with prior OCP    She had extremely erratically and heavy bleeding on Depo-Provera  9  Other-patient to follow up in the near future for yearly exam with Pap test   Visit greater than 45 min duration above ultrasound, with greater than 50% time spent counseling, coordinating care, and answering the patient's numerous questions  Diagnoses and all orders for this visit:    Vaginal candidiasis  -     fluconazole (DIFLUCAN) 150 mg tablet; Take 1 tablet (150 mg total) by mouth once for 1 dose Repeat in 1 week time  -     POCT wet mount    Bacterial vaginosis  -     metroNIDAZOLE (FLAGYL) 500 mg tablet; Take 1 tablet (500 mg total) by mouth 2 (two) times a day for 7 days  -     POCT wet mount    Pelvic pain    Dysmenorrhea    Dyspareunia in female    Irregular menses          Subjective:      Patient ID: Ginger Mckenna is a 25 y o  female  Patient was seen today for follow-up visit  Please see assessment plan for details  The following portions of the patient's history were reviewed and updated as appropriate: allergies, current medications, past family history, past medical history, past social history, past surgical history and problem list     Review of Systems   Constitutional: Negative for chills, diaphoresis, fatigue and fever  Respiratory: Negative for apnea, cough, chest tightness, shortness of breath and wheezing  Cardiovascular: Negative for chest pain, palpitations and leg swelling  Gastrointestinal: Negative for abdominal distention, abdominal pain, anal bleeding, constipation, diarrhea, nausea, rectal pain and vomiting  Genitourinary: Positive for dyspareunia, menstrual problem and pelvic pain  Negative for difficulty urinating, dysuria, frequency, hematuria, urgency, vaginal bleeding, vaginal discharge and vaginal pain  Musculoskeletal: Negative for arthralgias, back pain and myalgias  Skin: Negative for color change and rash     Neurological: Negative for dizziness, syncope, light-headedness, numbness and headaches  Hematological: Negative for adenopathy  Does not bruise/bleed easily  Psychiatric/Behavioral: Negative for dysphoric mood and sleep disturbance  The patient is not nervous/anxious  Objective: There were no vitals taken for this visit  Physical Exam    Objective      There were no vitals taken for this visit  General:   alert and oriented, in no acute distress, alert, appears stated age and cooperative   Neck:    Breast:    Heart:    Lungs:    Abdomen: soft, non-tender, without masses or organomegaly   Vulva: normal   Vagina: Small amount of vaginal blood, without erythema appreciated  Small amount of white discharge noted as well  Cervix: Small amount of white discharge with blood noted  No cervicitis  IUD string seen at a length of 1 5 cm    No Cervical motion tenderness   Uterus: normal size, anteverted, non-tender   Adnexa: no mass, fullness, tenderness   Rectum: deferred, patient declined

## 2018-04-19 LAB
CHLAMYDIA DNA CVX QL NAA+PROBE: NORMAL
N GONORRHOEA DNA GENITAL QL NAA+PROBE: NORMAL

## 2018-04-20 ENCOUNTER — TELEPHONE (OUTPATIENT)
Dept: OBGYN CLINIC | Facility: CLINIC | Age: 23
End: 2018-04-20

## 2018-04-20 NOTE — TELEPHONE ENCOUNTER
----- Message from Althea Montelongo MD sent at 4/20/2018  7:44 AM EDT -----  GC/chlamydia negative, please inform the patient

## 2018-07-26 NOTE — TELEPHONE ENCOUNTER
Called pt and left a delisa to let her know some of her results are still pending  She can call next week to follow up  Electrodesiccation And Curettage Text: The wound bed was treated with electrodesiccation and curettage after the biopsy was performed.

## 2018-07-30 ENCOUNTER — OFFICE VISIT (OUTPATIENT)
Dept: FAMILY MEDICINE CLINIC | Facility: CLINIC | Age: 23
End: 2018-07-30
Payer: COMMERCIAL

## 2018-07-30 VITALS
OXYGEN SATURATION: 97 % | HEIGHT: 61 IN | BODY MASS INDEX: 24.28 KG/M2 | SYSTOLIC BLOOD PRESSURE: 130 MMHG | HEART RATE: 102 BPM | DIASTOLIC BLOOD PRESSURE: 76 MMHG | RESPIRATION RATE: 16 BRPM | TEMPERATURE: 98 F | WEIGHT: 128.6 LBS

## 2018-07-30 DIAGNOSIS — J02.9 ACUTE PHARYNGITIS, UNSPECIFIED ETIOLOGY: Primary | ICD-10-CM

## 2018-07-30 PROCEDURE — 99213 OFFICE O/P EST LOW 20 MIN: CPT | Performed by: NURSE PRACTITIONER

## 2018-07-30 PROCEDURE — 87070 CULTURE OTHR SPECIMN AEROBIC: CPT | Performed by: NURSE PRACTITIONER

## 2018-07-30 PROCEDURE — 3008F BODY MASS INDEX DOCD: CPT | Performed by: NURSE PRACTITIONER

## 2018-07-30 PROCEDURE — 87147 CULTURE TYPE IMMUNOLOGIC: CPT | Performed by: NURSE PRACTITIONER

## 2018-07-30 RX ORDER — AMOXICILLIN 500 MG/1
500 CAPSULE ORAL EVERY 12 HOURS SCHEDULED
Qty: 20 CAPSULE | Refills: 0 | Status: SHIPPED | OUTPATIENT
Start: 2018-07-30 | End: 2018-08-09

## 2018-07-30 NOTE — LETTER
July 30, 2018     Patient: Jac Garcia   YOB: 1995   Date of Visit: 7/30/2018       To Whom it May Concern:    Jac Garcia is under my professional care  She was seen in my office on 7/30/2018  She may return to work on 7/31/2018  If you have any questions or concerns, please don't hesitate to call           Sincerely,          MAGALIS Hammond        CC: No Recipients

## 2018-07-30 NOTE — PROGRESS NOTES
Chief Complaint   Patient presents with    Sore Throat    Earache    Generalized Body Aches       Assessment/Plan:     Diagnoses and all orders for this visit:    Acute pharyngitis, unspecified etiology  -     Throat culture; Future  -     amoxicillin (AMOXIL) 500 mg capsule; Take 1 capsule (500 mg total) by mouth every 12 (twelve) hours for 10 days    Other orders  -     Melatonin 5 MG CAPS; Take 5 mg by mouth          Subjective:      Patient ID: Maggie Wagner is a 21 y o  female  Sore Throat    This is a new problem  The current episode started in the past 7 days  The problem has been unchanged  Sore throat worse side: midline  There has been no fever  The pain is at a severity of 6/10  The pain is moderate  Associated symptoms include coughing, ear pain (with coughing ), headaches, a hoarse voice, neck pain, swollen glands and trouble swallowing  Pertinent negatives include no abdominal pain, congestion, ear discharge or shortness of breath  Earache    Associated symptoms include coughing, headaches, neck pain and a sore throat  Pertinent negatives include no abdominal pain or ear discharge  Generalized Body Aches   Associated symptoms include ear pain (with coughing ), headaches, a sore throat, swollen glands, fatigue, coughing and neck pain  Pertinent negatives include no congestion, ear discharge, fever, chest pain, shortness of breath or abdominal pain  The following portions of the patient's history were reviewed and updated as appropriate: allergies, current medications, past family history, past medical history, past social history, past surgical history and problem list     Review of Systems   Constitutional: Positive for fatigue  Negative for fever  HENT: Positive for ear pain (with coughing ), hoarse voice, sore throat and trouble swallowing  Negative for congestion and ear discharge  Respiratory: Positive for cough  Negative for shortness of breath      Cardiovascular: Negative for chest pain  Gastrointestinal: Negative for abdominal pain  Musculoskeletal: Positive for neck pain  Neurological: Positive for headaches  Psychiatric/Behavioral: Positive for sleep disturbance  The patient is nervous/anxious  Objective:      /76 (BP Location: Left arm, Patient Position: Sitting, Cuff Size: Standard)   Pulse 102   Temp 98 °F (36 7 °C) (Temporal)   Resp 16   Ht 5' 1" (1 549 m)   Wt 58 3 kg (128 lb 9 6 oz)   SpO2 97%   BMI 24 30 kg/m²          Physical Exam   Constitutional: She appears well-developed and well-nourished  She has a sickly appearance  HENT:   Head: Normocephalic and atraumatic  Right Ear: Tympanic membrane is erythematous  Left Ear: Tympanic membrane normal    Nose: Nose normal    Mouth/Throat: Posterior oropharyngeal erythema present  Neck:   Generalized neck tenderness    Cardiovascular: Regular rhythm and normal heart sounds  Tachycardia present  No murmur heard  Pulmonary/Chest: Effort normal and breath sounds normal    Lymphadenopathy:     She has cervical adenopathy  Right cervical: Superficial cervical adenopathy present  Left cervical: Superficial cervical adenopathy present

## 2018-07-31 ENCOUNTER — TELEPHONE (OUTPATIENT)
Dept: FAMILY MEDICINE CLINIC | Facility: CLINIC | Age: 23
End: 2018-07-31

## 2018-07-31 NOTE — TELEPHONE ENCOUNTER
I gave her antibiotics  She can take motrin for the ear pain 600mg every 8 hours  She can use chloraseptic throat spray that is also OTC   She can take delysm as cough suppressant

## 2018-07-31 NOTE — TELEPHONE ENCOUNTER
----- Message from [address] [place] [address] sent at 7/31/2018 11:20 AM EDT -----  No strep

## 2018-07-31 NOTE — TELEPHONE ENCOUNTER
Patient called in stating she developed dry cough and bilateral ear pain  Per patient her Left ear hurts more  What can she do? Is there anything over the counter she can take or something can be called in to her pharmacy  Please advice

## 2018-08-02 LAB — BACTERIA THROAT CULT: ABNORMAL

## 2018-08-07 ENCOUNTER — TELEPHONE (OUTPATIENT)
Dept: FAMILY MEDICINE CLINIC | Facility: CLINIC | Age: 23
End: 2018-08-07

## 2018-08-07 DIAGNOSIS — T36.95XA ANTIBIOTIC-INDUCED YEAST INFECTION: Primary | ICD-10-CM

## 2018-08-07 DIAGNOSIS — B37.9 ANTIBIOTIC-INDUCED YEAST INFECTION: Primary | ICD-10-CM

## 2018-08-07 RX ORDER — FLUCONAZOLE 150 MG/1
150 TABLET ORAL ONCE
Qty: 1 TABLET | Refills: 0 | Status: SHIPPED | OUTPATIENT
Start: 2018-08-07 | End: 2018-08-07

## 2018-08-07 NOTE — TELEPHONE ENCOUNTER
Pt called stating yeast infection, pt wanted to know if you can Rx for sx  Pt also states she has been taking antibiotics for her throat for the past 10 days  Pls advise

## 2018-08-15 ENCOUNTER — CLINICAL SUPPORT (OUTPATIENT)
Dept: FAMILY MEDICINE CLINIC | Facility: CLINIC | Age: 23
End: 2018-08-15
Payer: COMMERCIAL

## 2018-08-15 DIAGNOSIS — Z00.8 ENCOUNTER FOR OTHER GENERAL EXAMINATION: Primary | ICD-10-CM

## 2018-08-15 LAB
CHOLEST SERPL-MCNC: 149 MG/DL (ref 50–200)
EST. AVERAGE GLUCOSE BLD GHB EST-MCNC: 105 MG/DL
HBA1C MFR BLD: 5.3 % (ref 4.2–6.3)
HDLC SERPL-MCNC: 42 MG/DL (ref 40–60)
LDLC SERPL CALC-MCNC: 92 MG/DL (ref 0–100)
NONHDLC SERPL-MCNC: 107 MG/DL
TRIGL SERPL-MCNC: 74 MG/DL

## 2018-08-15 PROCEDURE — 83036 HEMOGLOBIN GLYCOSYLATED A1C: CPT | Performed by: PREVENTIVE MEDICINE

## 2018-08-15 PROCEDURE — 80061 LIPID PANEL: CPT | Performed by: PREVENTIVE MEDICINE

## 2018-08-15 PROCEDURE — 36415 COLL VENOUS BLD VENIPUNCTURE: CPT | Performed by: NURSE PRACTITIONER

## 2018-09-25 ENCOUNTER — ANNUAL EXAM (OUTPATIENT)
Dept: OBGYN CLINIC | Facility: CLINIC | Age: 23
End: 2018-09-25
Payer: COMMERCIAL

## 2018-09-25 VITALS
SYSTOLIC BLOOD PRESSURE: 122 MMHG | DIASTOLIC BLOOD PRESSURE: 78 MMHG | BODY MASS INDEX: 24.05 KG/M2 | HEIGHT: 61 IN | WEIGHT: 127.4 LBS

## 2018-09-25 DIAGNOSIS — Z01.419 WOMEN'S ANNUAL ROUTINE GYNECOLOGICAL EXAMINATION: ICD-10-CM

## 2018-09-25 DIAGNOSIS — Z11.51 SCREENING FOR HPV (HUMAN PAPILLOMAVIRUS): ICD-10-CM

## 2018-09-25 DIAGNOSIS — Z11.3 SCREENING FOR STD (SEXUALLY TRANSMITTED DISEASE): Primary | ICD-10-CM

## 2018-09-25 DIAGNOSIS — Z12.4 SCREENING FOR CERVICAL CANCER: ICD-10-CM

## 2018-09-25 PROBLEM — N94.6 DYSMENORRHEA: Status: RESOLVED | Noted: 2017-02-24 | Resolved: 2018-09-25

## 2018-09-25 PROBLEM — N92.6 IRREGULAR MENSES: Status: RESOLVED | Noted: 2018-04-17 | Resolved: 2018-09-25

## 2018-09-25 PROBLEM — N94.10 DYSPAREUNIA IN FEMALE: Status: RESOLVED | Noted: 2018-04-17 | Resolved: 2018-09-25

## 2018-09-25 PROBLEM — Z97.5 IUD CONTRACEPTION: Status: ACTIVE | Noted: 2018-09-25

## 2018-09-25 PROBLEM — R10.2 PELVIC PAIN: Status: RESOLVED | Noted: 2018-04-17 | Resolved: 2018-09-25

## 2018-09-25 PROBLEM — D50.9 IRON DEFICIENCY ANEMIA: Status: RESOLVED | Noted: 2017-01-12 | Resolved: 2018-09-25

## 2018-09-25 PROCEDURE — 99395 PREV VISIT EST AGE 18-39: CPT | Performed by: OBSTETRICS & GYNECOLOGY

## 2018-09-25 PROCEDURE — 87491 CHLMYD TRACH DNA AMP PROBE: CPT | Performed by: OBSTETRICS & GYNECOLOGY

## 2018-09-25 PROCEDURE — G0145 SCR C/V CYTO,THINLAYER,RESCR: HCPCS | Performed by: OBSTETRICS & GYNECOLOGY

## 2018-09-25 PROCEDURE — 87591 N.GONORRHOEAE DNA AMP PROB: CPT | Performed by: OBSTETRICS & GYNECOLOGY

## 2018-09-25 NOTE — PROGRESS NOTES
Assessment/Plan:   1  Yearly exam-Pap smear done, self-breast awareness reviewed  Patient counseled about GC/chlamydia and she wished to have it done and was done with the Pap today  She has been with her partner for 1 year's time  Recent testing was negative April 2018 but she requests repeat and was done  2   Mirena IUD-placed on 3/7/17  She has had excellent response to this with resolution of any menorrhagia or pelvic pain  She will continue with this IUD at this time  3   Long history of menorrhagia with anemia-resolved with Mirena IUD  Please see note from March 2017 for detailed history  4  History of pelvic pain/dyspareunia-resolved  5  Prior history mixed vaginitis-no symptoms noted at this time  6   Possible von Willebrand's disorder-patient did see Hematology in the past with possible diagnosis  Follow-up testing was reportedly normal  She will follow up with them as needed  7   Other-patient is strongly considering pregnancy in the future  Therefore, she inquired about removal of the IUD  Given her severe anemia, menorrhagia, and pelvic pain off of the IUD, would strongly strongly suggest that she not remove the device until she is actively going to attempt pregnancy  She will discuss this with her partner and she will return in the near future for removal if interested  Otherwise, she will follow up in 1 year or as needed  No problem-specific Assessment & Plan notes found for this encounter  Diagnoses and all orders for this visit:    Women's annual routine gynecological examination          Subjective:      Patient ID: Jumana Van is a 21 y o  female  Patient was seen today for yearly exam   Please see assessment plan for details          The following portions of the patient's history were reviewed and updated as appropriate: allergies, current medications, past family history, past medical history, past social history, past surgical history and problem list     Review of Systems   Constitutional: Negative for chills, diaphoresis, fatigue and fever  Respiratory: Negative for apnea, cough, chest tightness, shortness of breath and wheezing  Cardiovascular: Negative for chest pain, palpitations and leg swelling  Gastrointestinal: Negative for abdominal distention, abdominal pain, anal bleeding, constipation, diarrhea, nausea, rectal pain and vomiting  Genitourinary: Negative for difficulty urinating, dyspareunia, dysuria, frequency, hematuria, menstrual problem, pelvic pain, urgency, vaginal bleeding, vaginal discharge and vaginal pain  Musculoskeletal: Negative for arthralgias, back pain and myalgias  Skin: Negative for color change and rash  Neurological: Negative for dizziness, syncope, light-headedness, numbness and headaches  Hematological: Negative for adenopathy  Does not bruise/bleed easily  Psychiatric/Behavioral: Negative for dysphoric mood and sleep disturbance  The patient is not nervous/anxious  Objective:      /78 (BP Location: Left arm, Patient Position: Sitting, Cuff Size: Standard)   Ht 5' 1" (1 549 m)   Wt 57 8 kg (127 lb 6 4 oz)   Breastfeeding? No   BMI 24 07 kg/m²          Physical Exam    Objective      /78 (BP Location: Left arm, Patient Position: Sitting, Cuff Size: Standard)   Ht 5' 1" (1 549 m)   Wt 57 8 kg (127 lb 6 4 oz)   Breastfeeding? No   BMI 24 07 kg/m²     General:   alert and oriented, in no acute distress, alert, appears stated age and cooperative   Neck: normal to inspection and palpation   Breast: normal appearance, no masses or tenderness   Heart:    Lungs:    Abdomen: soft, non-tender, without masses or organomegaly   Vulva: normal   Vagina: Without erythema or lesions or discharge  Normal   Cervix: Without lesions or discharge or cervicitis  No CM T  Light bleeding with Pap     IUD string seen at a length of 1 5 -2 cm and normal   Uterus: normal size, anteverted, non-tender   Adnexa: no mass, fullness, tenderness   Rectum: deferred, patient declined

## 2018-09-25 NOTE — PATIENT INSTRUCTIONS
Intrauterine Device   WHAT YOU NEED TO KNOW:   What is an intrauterine device? An intrauterine device (IUD) is a type of birth control that is inserted into your uterus  It is a small, flexible piece of plastic with a string on the end  It is inserted and removed by your healthcare provider  IUDs prevent sperm from reaching or fertilizing an egg  IUDs also prevent a fertilized egg from attaching to the uterus and developing into a fetus  What are the most common types of IUDs? · Copper: This type of IUD slowly releases a small amount of copper into your uterus  This IUD can remain in place for up to 10 years  · Hormone-releasing: This type of IUD slowly releases a small amount of progesterone into your uterus  Progesterone is a hormone that is made by your body to help control your periods  This IUD can remain in place for up to 5 years  What are the advantages of an IUD? · Effective: An IUD is 98% to 99% effective in preventing pregnancy  · Easily removable: The IUD can be removed if you decide to have a baby  You may be able to get pregnant as soon as the IUD is removed  · Immediate:  An IUD protects you from pregnancy right after it is inserted  · Convenient:  You do not have to stop sexual activity to insert it or worry about remembering to take your birth control pill  · Safe:  Copper IUDs are safer for some women than oral birth control pills  Examples include women who smoke or have a history of blood clots  · Health effects:  Hormone-releasing IUDs may decrease certain health problems  Examples include bleeding and cramping that happen with your monthly period  What are the risks of an IUD? · An IUD does not protect you from sexually transmitted infections  You may have cramps during the first weeks after you get the IUD  A copper IUD may cause your monthly period to be heavier or more painful  This is more common within the first 3 months after you get the IUD   You may need to have your IUD removed if your bleeding or pain becomes severe  You may have spotting between periods  · There is a small chance that you could get pregnant  Sometimes the IUD cannot be removed after you get pregnant  This increases your risk of a miscarriage or an ectopic pregnancy  Ectopic pregnancy is when the fertilized egg starts to grow somewhere other than your uterus  There is a small risk of an infection within the first 20 days after the IUD is placed  Infection can lead to pelvic inflammatory disease  This can cause infertility  Your uterus may tear when the IUD is inserted  The IUD may slip part or all of the way out of your uterus  How is the IUD inserted? · The IUD is usually inserted during your monthly period  This may help decrease the amount of discomfort you have during the procedure  It also helps ensure that you are not pregnant  You will be asked to lie down and place your feet in stirrups  Your healthcare provider will gently insert a speculum into your vagina  This is the same tool used during a pap smear  The speculum allows your healthcare provider to see inside your vagina to your cervix  The cervix is the opening of your uterus  · Your healthcare provider will clean your cervix with an antiseptic solution to prevent infection  You may be given numbing medicine  A long plastic tube is gently passed through your cervix and into your uterus  This tube has the IUD inside of it  The IUD is pushed out of the tube and into your uterus  You may have cramps as the IUD is inserted  The tube is removed after the IUD is in place  How can I make sure my IUD is still in place? An IUD has a string made of plastic thread  One to 2 inches of this string hangs into your vagina  You cannot see this string, and it will not cause problems when you have sex  Check your IUD string every 3 days for the first 3 months after it is inserted  After that, check the string after each monthly period   Do the following to check the placement of your IUD:  · Wash your hands with soap and warm water  Dry them with a clean towel  · Bend your knees and squat low to the ground  · Gently put your index finger inside your vagina  The cervix is at the top of the vagina and feels like the tip of your nose  Feel for the IUD string  Do not pull on the string  You should not be able to feel the firm plastic of the IUD itself  · Wash your hands after you check your IUD string  Where can I find more information? · [facility] [address] , [address] [place]  Phone: 1- 449 - 629-0271  Web Address: https://[employer]/  org  When should I contact my healthcare provider? · You think you are pregnant  · You bleed after you have sex  · You have pain during sex  · You cannot feel the IUD string, the string feels longer, or you feel the plastic of the IUD itself  · You have vaginal discharge that is green, yellow, or has a foul odor  · You have questions or concerns about your condition or care  When should I seek immediate care? · You have severe pain or bleeding during your period  · You have a fever and severe abdominal pain  CARE AGREEMENT:   You have the right to help plan your care  Learn about your health condition and how it may be treated  Discuss treatment options with your caregivers to decide what care you want to receive  You always have the right to refuse treatment  The above information is an  only  It is not intended as medical advice for individual conditions or treatments  Talk to your doctor, nurse or pharmacist before following any medical regimen to see if it is safe and effective for you  © 2017 [employer] Information is for End User's use only and may not be sold, redistributed or otherwise used for commercial purposes   All illustrations and images included in CareNotes® are the copyrighted property of [employer] M [employer]  or [employer]

## 2018-09-26 LAB
CHLAMYDIA DNA CVX QL NAA+PROBE: NORMAL
N GONORRHOEA DNA GENITAL QL NAA+PROBE: NORMAL

## 2018-09-27 ENCOUNTER — TELEPHONE (OUTPATIENT)
Dept: OBGYN CLINIC | Facility: CLINIC | Age: 23
End: 2018-09-27

## 2018-09-28 LAB
LAB AP GYN PRIMARY INTERPRETATION: NORMAL
Lab: NORMAL

## 2018-10-02 ENCOUNTER — TELEPHONE (OUTPATIENT)
Dept: OBGYN CLINIC | Facility: CLINIC | Age: 23
End: 2018-10-02

## 2018-10-08 ENCOUNTER — OFFICE VISIT (OUTPATIENT)
Dept: URGENT CARE | Age: 23
End: 2018-10-08
Payer: COMMERCIAL

## 2018-10-08 VITALS
RESPIRATION RATE: 16 BRPM | WEIGHT: 127 LBS | DIASTOLIC BLOOD PRESSURE: 79 MMHG | HEART RATE: 82 BPM | BODY MASS INDEX: 23.98 KG/M2 | OXYGEN SATURATION: 99 % | HEIGHT: 61 IN | TEMPERATURE: 97.3 F | SYSTOLIC BLOOD PRESSURE: 122 MMHG

## 2018-10-08 DIAGNOSIS — J45.901 ASTHMA WITH ACUTE EXACERBATION, UNSPECIFIED ASTHMA SEVERITY, UNSPECIFIED WHETHER PERSISTENT: ICD-10-CM

## 2018-10-08 DIAGNOSIS — B34.9 VIRAL INFECTION: Primary | ICD-10-CM

## 2018-10-08 PROCEDURE — S9088 SERVICES PROVIDED IN URGENT: HCPCS | Performed by: PHYSICIAN ASSISTANT

## 2018-10-08 PROCEDURE — 99213 OFFICE O/P EST LOW 20 MIN: CPT | Performed by: PHYSICIAN ASSISTANT

## 2018-10-08 RX ORDER — BROMPHENIRAMINE MALEATE, PSEUDOEPHEDRINE HYDROCHLORIDE, AND DEXTROMETHORPHAN HYDROBROMIDE 2; 30; 10 MG/5ML; MG/5ML; MG/5ML
5 SYRUP ORAL 4 TIMES DAILY PRN
Qty: 120 ML | Refills: 0 | Status: SHIPPED | OUTPATIENT
Start: 2018-10-08 | End: 2018-11-05 | Stop reason: ALTCHOICE

## 2018-10-08 RX ORDER — PREDNISONE 20 MG/1
40 TABLET ORAL DAILY
Qty: 14 TABLET | Refills: 0 | Status: SHIPPED | OUTPATIENT
Start: 2018-10-08 | End: 2018-10-15

## 2018-10-08 NOTE — LETTER
October 8, 2018     Patient: Rubens Sam   YOB: 1995   Date of Visit: 10/8/2018       To Whom It May Concern: It is my medical opinion that Rubens Sam may return to work on 10/9/2018  If you have any questions or concerns, please don't hesitate to call           Sincerely,        Deven Rascon PA-C    CC: No Recipients

## 2018-10-08 NOTE — PATIENT INSTRUCTIONS
Take Bromfed DM and prednisone as prescribed  Fluids and rest  Nasal saline spray; Afrin if severe congestion (do not use for more than 3 days)  Over the counter decongestant  Tylenol/Ibuprofen for pain/fever  Salt water gargles and chloraseptic spray  Throat Coat Tea  Warm compresses over sinuses  Steam treatment (utilize proper safety precautions when in contact with hot water/steam)  Follow up with PCP in 3-5 days  Proceed to  ER if symptoms worsen  Cold Symptoms   WHAT YOU NEED TO KNOW:   A cold is an infection caused by a virus  The infection causes your upper respiratory system to become inflamed  Common symptoms of a cold include sneezing, dry throat, a stuffy nose, headache, watery eyes, and a cough  Your cough may be dry, or you may cough up mucus  You may also have muscle aches, joint pain, and tiredness  Rarely, you may have a fever  Most colds go away without treatment  DISCHARGE INSTRUCTIONS:   Return to the emergency department if:   · You have increased tiredness and weakness  · You are unable to eat  · Your heart is beating much faster than usual for you  · You see white spots in the back of your throat and your neck is swollen and sore to the touch  · You see pinpoint or larger reddish-purple dots on your skin  Contact your healthcare provider if:   · You have a fever higher than 102°F (38 9°C)  · You have new or worsening shortness of breath  · You have thick nasal drainage for more than 2 days  · Your symptoms do not improve or get worse within 5 days  · You have questions or concerns about your condition or care  Medicines: The following medicines may be suggested by your healthcare provider to decrease your cold symptoms  These medicines are available without a doctor's order  Ask which medicines to take and when to take them  Follow directions  · NSAIDs or acetaminophen  help to bring down a fever or decrease pain      · Decongestants  help decrease nasal stuffiness  · Antihistamines  help decrease sneezing and a runny nose  · Cough suppressants  help decrease how much you cough  · Expectorants  help loosen mucus so you can cough it up  · Take your medicine as directed  Contact your healthcare provider if you think your medicine is not helping or if you have side effects  Tell him of her if you are allergic to any medicine  Keep a list of the medicines, vitamins, and herbs you take  Include the amounts, and when and why you take them  Bring the list or the pill bottles to follow-up visits  Carry your medicine list with you in case of an emergency  Symptom relief: The following may help relieve cold symptoms, such as a dry throat and congestion:  · Gargle with mouthwash or warm salt water as directed  · Suck on throat lozenges or hard candy  · Use a cold or warm vaporizer or humidifier to ease your breathing  · Rest for at least 2 days and then as needed to decrease tiredness and weakness  · Use petroleum based jelly around your nostrils to decrease irritation from blowing your nose  Drink liquids:  Liquids will help thin and loosen thick mucus so you can cough it up  Liquids will also keep you hydrated  Ask your healthcare provider which liquids are best for you and how much to drink each day  Prevent the spread of germs: You can spread your cold germs to others for at least 3 days after your symptoms start  Wash your hands often  Do not share items, such as eating utensils  Cover your nose and mouth when you cough or sneeze using the crook of your elbow instead of your hands  Throw used tissues in the garbage  Do not smoke:  Smoking may worsen your symptoms and increase the length of time you feel sick  Talk with your healthcare provider if you need help to stop smoking  Follow up with your healthcare provider as directed:  Write down your questions so you remember to ask them during your visits     © 2017 [employer] Information is for End User's use only and may not be sold, redistributed or otherwise used for commercial purposes  All illustrations and images included in CareNotes® are the copyrighted property of A D A M [employer] Inc  or [employer]  The above information is an  only  It is not intended as medical advice for individual conditions or treatments  Talk to your doctor, nurse or pharmacist before following any medical regimen to see if it is safe and effective for you

## 2018-10-08 NOTE — PROGRESS NOTES
[facility]        NAME: Mady Buchanan is a 21 y o  female  : 1995    MRN: [id number]  DATE: 2018  TIME: 7:20 PM    Assessment and Plan   Viral infection [B34 9]  1  Viral infection  brompheniramine-pseudoephedrine-DM 30-2-10 MG/5ML syrup   2  Asthma with acute exacerbation, unspecified asthma severity, unspecified whether persistent  predniSONE 20 mg tablet     Instructed patient to take albuterol inhaler every 4-6 hours  Patient Instructions     Take Bromfed DM and prednisone as prescribed  Fluids and rest  Nasal saline spray; Afrin if severe congestion (do not use for more than 3 days)  Over the counter decongestant  Tylenol/Ibuprofen for pain/fever  Salt water gargles and chloraseptic spray  Throat Coat Tea  Warm compresses over sinuses  Steam treatment (utilize proper safety precautions when in contact with hot water/steam)  Follow up with PCP in 3-5 days  Proceed to  ER if symptoms worsen  Chief Complaint     Chief Complaint   Patient presents with    Cough     cough and bilateral ear pain started today         History of Present Illness       Cough   This is a new problem  The current episode started today  The problem has been unchanged  The problem occurs constantly  The cough is non-productive  Associated symptoms include chills, ear pain, a sore throat and wheezing  Pertinent negatives include no chest pain, ear congestion, fever, headaches, heartburn, hemoptysis, myalgias, nasal congestion, postnasal drip, rash, rhinorrhea, shortness of breath, sweats or weight loss  She has tried nothing for the symptoms  Her past medical history is significant for asthma and bronchitis  There is no history of pneumonia  Review of Systems   Review of Systems   Constitutional: Positive for chills  Negative for activity change, appetite change, fatigue, fever and weight loss  HENT: Positive for ear pain and sore throat   Negative for congestion, ear discharge, postnasal drip, rhinorrhea, sinus pain, sinus pressure, sneezing and trouble swallowing  Respiratory: Positive for cough and wheezing  Negative for hemoptysis, chest tightness and shortness of breath  Cardiovascular: Negative for chest pain  Gastrointestinal: Negative for abdominal pain, diarrhea, heartburn, nausea and vomiting  Musculoskeletal: Negative for myalgias  Skin: Negative for rash  Neurological: Negative for light-headedness and headaches  Current Medications       Current Outpatient Prescriptions:     ibuprofen (MOTRIN) 600 mg tablet, Take 1 tablet (600 mg total) by mouth every 6 (six) hours as needed for mild pain (ear aches), Disp: 30 tablet, Rfl: 0    Melatonin 5 MG CAPS, Take 5 mg by mouth, Disp: , Rfl:     brompheniramine-pseudoephedrine-DM 30-2-10 MG/5ML syrup, Take 5 mL by mouth 4 (four) times a day as needed for cough, Disp: 120 mL, Rfl: 0    predniSONE 20 mg tablet, Take 2 tablets (40 mg total) by mouth daily for 7 days, Disp: 14 tablet, Rfl: 0    Current Allergies     Allergies as of 10/08/2018 - Reviewed 10/08/2018   Allergen Reaction Noted    Cats claw (uncaria tomentosa) Eye Swelling 02/14/2017    Dog epithelium Eye Swelling 02/14/2017    Effexor [venlafaxine] Nausea Only and Tachycardia 03/12/2018    Sertraline Nausea Only and Tachycardia 03/12/2018            The following portions of the patient's history were reviewed and updated as appropriate: allergies, current medications, past family history, past medical history, past social history, past surgical history and problem list      Past Medical History:   Diagnosis Date    Anemia     Asthma     Exposure to STD     10 may 2017 resolved    Mental disorder     Von Willebrand disease ([facility] [address] )        History reviewed  No pertinent surgical history      Family History   Problem Relation Age of Onset    Hypertension Mother     Heart disease Father     Coronary artery disease Father     Hypertension Father Medications have been verified  Objective   /79 (BP Location: Left arm, Patient Position: Sitting)   Pulse 82   Temp (!) 97 3 °F (36 3 °C) (Tympanic)   Resp 16   Ht 5' 1" (1 549 m)   Wt 57 6 kg (127 lb)   SpO2 99%   BMI 24 00 kg/m²        Physical Exam     Physical Exam   Constitutional: She is oriented to person, place, and time  She appears well-developed and well-nourished  No distress  HENT:   Head: Normocephalic  Right Ear: External ear normal    Left Ear: External ear normal    Nose: Nose normal    Mouth/Throat: Oropharynx is clear and moist  No oropharyngeal exudate  Eyes: Conjunctivae are normal    Cardiovascular: Normal rate, regular rhythm, normal heart sounds and intact distal pulses  Exam reveals no gallop and no friction rub  No murmur heard  Pulmonary/Chest: Effort normal and breath sounds normal  No respiratory distress  She has no wheezes  She has no rales  She exhibits no tenderness  Lymphadenopathy:     She has no cervical adenopathy  Neurological: She is alert and oriented to person, place, and time  Skin: Skin is warm  She is not diaphoretic  Psychiatric: She has a normal mood and affect  Her behavior is normal  Judgment and thought content normal    Vitals reviewed

## 2018-10-19 ENCOUNTER — TELEPHONE (OUTPATIENT)
Dept: FAMILY MEDICINE CLINIC | Facility: CLINIC | Age: 23
End: 2018-10-19

## 2018-10-19 ENCOUNTER — OFFICE VISIT (OUTPATIENT)
Dept: FAMILY MEDICINE CLINIC | Facility: CLINIC | Age: 23
End: 2018-10-19
Payer: COMMERCIAL

## 2018-10-19 VITALS
BODY MASS INDEX: 24 KG/M2 | SYSTOLIC BLOOD PRESSURE: 108 MMHG | WEIGHT: 127 LBS | DIASTOLIC BLOOD PRESSURE: 76 MMHG | HEART RATE: 72 BPM | RESPIRATION RATE: 18 BRPM | TEMPERATURE: 97.9 F

## 2018-10-19 DIAGNOSIS — J20.8 ACUTE BRONCHITIS DUE TO OTHER SPECIFIED ORGANISMS: Primary | ICD-10-CM

## 2018-10-19 PROCEDURE — 99213 OFFICE O/P EST LOW 20 MIN: CPT | Performed by: NURSE PRACTITIONER

## 2018-10-19 RX ORDER — METHYLPREDNISOLONE 4 MG/1
TABLET ORAL
Qty: 21 TABLET | Refills: 0 | Status: SHIPPED | OUTPATIENT
Start: 2018-10-19 | End: 2018-10-19 | Stop reason: SDUPTHER

## 2018-10-19 RX ORDER — METHYLPREDNISOLONE 4 MG/1
TABLET ORAL
Qty: 21 TABLET | Refills: 0 | Status: SHIPPED | OUTPATIENT
Start: 2018-10-19 | End: 2018-11-05 | Stop reason: SDUPTHER

## 2018-10-19 RX ORDER — AZITHROMYCIN 250 MG/1
TABLET, FILM COATED ORAL
Qty: 6 TABLET | Refills: 0 | Status: SHIPPED | OUTPATIENT
Start: 2018-10-19 | End: 2018-10-23

## 2018-10-19 RX ORDER — AZITHROMYCIN 250 MG/1
TABLET, FILM COATED ORAL
Qty: 6 TABLET | Refills: 0 | Status: SHIPPED | OUTPATIENT
Start: 2018-10-19 | End: 2018-10-19 | Stop reason: SDUPTHER

## 2018-10-19 NOTE — TELEPHONE ENCOUNTER
Pt rx was called and canceled from [facility] and resent over to [facility] in [facility]  Per Jack Quesada request

## 2018-10-19 NOTE — PROGRESS NOTES
Chief Complaint   Patient presents with    Sore Throat     Patient present today for asore Throat, nasal drip, abdominal pain, headache, dry cough, ear pain, chest tightness, body aches and face pressure for the last 2 days  Assessment/Plan:     Diagnoses and all orders for this visit:    Acute bronchitis due to other specified organisms  -     azithromycin (ZITHROMAX) 250 mg tablet; Take 2 tablets today then 1 tablet daily x 4 days  -     Methylprednisolone 4 MG TBPK; Use as directed on package          Subjective:      Patient ID: Norberto Berkowitz is a 21 y o  female  Sore Throat    This is a new problem  The current episode started in the past 7 days  The problem has been waxing and waning  Neither side of throat is experiencing more pain than the other  There has been no fever  Associated symptoms include congestion, coughing, ear pain, a hoarse voice, a plugged ear sensation and shortness of breath  Pertinent negatives include no trouble swallowing or vomiting  Treatments tried: oTC cough medication  The treatment provided mild relief  Cough   This is a new problem  The current episode started 1 to 4 weeks ago  The problem has been unchanged  The problem occurs constantly  The cough is non-productive  Associated symptoms include chest pain, chills, ear congestion, ear pain, postnasal drip, a sore throat, shortness of breath and wheezing  Pertinent negatives include no fever  The symptoms are aggravated by stress and cold air  She has tried nothing for the symptoms  The treatment provided mild relief  Her past medical history is significant for asthma  The following portions of the patient's history were reviewed and updated as appropriate: allergies, current medications, past family history, past medical history, past social history, past surgical history and problem list     Review of Systems   Constitutional: Positive for chills  Negative for diaphoresis and fever     HENT: Positive for congestion, ear pain, hoarse voice, postnasal drip and sore throat  Negative for trouble swallowing  Respiratory: Positive for cough, shortness of breath and wheezing  Cardiovascular: Positive for chest pain  Gastrointestinal: Positive for nausea  Negative for vomiting  Psychiatric/Behavioral: Positive for sleep disturbance  Objective:      /76 (BP Location: Right arm, Patient Position: Sitting, Cuff Size: Standard)   Pulse 72   Temp 97 9 °F (36 6 °C) (Temporal)   Resp 18   Wt 57 6 kg (127 lb)   BMI 24 00 kg/m²          Physical Exam   Constitutional: She is oriented to person, place, and time  HENT:   Head: Normocephalic and atraumatic  Left Ear: A middle ear effusion is present  Nose: Nose normal    Mouth/Throat: Posterior oropharyngeal erythema present  Right ear cerumen impaction    Cardiovascular: Normal rate, regular rhythm and normal heart sounds  No murmur heard  Pulmonary/Chest: Effort normal  She has wheezes  Neurological: She is alert and oriented to person, place, and time  Psychiatric: She has a normal mood and affect   Thought content normal

## 2018-11-05 ENCOUNTER — OFFICE VISIT (OUTPATIENT)
Dept: FAMILY MEDICINE CLINIC | Facility: CLINIC | Age: 23
End: 2018-11-05
Payer: COMMERCIAL

## 2018-11-05 VITALS
SYSTOLIC BLOOD PRESSURE: 100 MMHG | WEIGHT: 129 LBS | BODY MASS INDEX: 24.35 KG/M2 | DIASTOLIC BLOOD PRESSURE: 88 MMHG | TEMPERATURE: 98.4 F | RESPIRATION RATE: 16 BRPM | HEART RATE: 94 BPM | HEIGHT: 61 IN

## 2018-11-05 DIAGNOSIS — J30.9 ALLERGIC RHINITIS, UNSPECIFIED SEASONALITY, UNSPECIFIED TRIGGER: Primary | ICD-10-CM

## 2018-11-05 DIAGNOSIS — R11.2 NAUSEA AND VOMITING, INTRACTABILITY OF VOMITING NOT SPECIFIED, UNSPECIFIED VOMITING TYPE: ICD-10-CM

## 2018-11-05 PROCEDURE — 99213 OFFICE O/P EST LOW 20 MIN: CPT | Performed by: NURSE PRACTITIONER

## 2018-11-05 RX ORDER — FLUTICASONE PROPIONATE 50 MCG
1 SPRAY, SUSPENSION (ML) NASAL DAILY
Qty: 16 G | Refills: 0 | Status: SHIPPED | OUTPATIENT
Start: 2018-11-05 | End: 2018-11-26 | Stop reason: SDDI

## 2018-11-05 RX ORDER — METHYLPREDNISOLONE 4 MG/1
TABLET ORAL
Qty: 21 TABLET | Refills: 0 | Status: SHIPPED | OUTPATIENT
Start: 2018-11-05 | End: 2018-11-26 | Stop reason: ALTCHOICE

## 2018-11-05 NOTE — LETTER
November 5, 2018     Patient: Rubens Sam   YOB: 1995   Date of Visit: 11/5/2018       To Whom it May Concern:    Rubens Sam is under my professional care  She was seen in my office on 11/5/2018  She may return to work on 11/6/18  if patient still has vomitting tomorrow  note can be exteneded until 11/7/2018  If you have any questions or concerns, please don't hesitate to call           Sincerely,          MAGALIS Claros        CC: No Recipients

## 2018-11-05 NOTE — PROGRESS NOTES
Chief Complaint   Patient presents with   Lorena Karen Like Symptoms     patient here for ear ache, chest congestion, cough, sore throat, vomiting, nausea  Pt states possible astham flare up       Assessment/Plan:       Diagnoses and all orders for this visit:    Allergic rhinitis, unspecified seasonality, unspecified trigger  -     Methylprednisolone 4 MG TBPK; Use as directed on package  -     fluticasone (FLONASE) 50 mcg/act nasal spray; 1 spray into each nostril daily    Nausea and vomiting, intractability of vomiting not specified, unspecified vomiting type          Subjective:      Patient ID: Mady Buchanan is a 21 y o  female  Patient here for onset of cough and wheeze with ear itching and sore throat for the last 2 days  The following portions of the patient's history were reviewed and updated as appropriate: allergies, current medications, past family history, past medical history, past social history, past surgical history and problem list     Review of Systems   Constitutional: Negative for diaphoresis, fatigue and fever  HENT: Positive for rhinorrhea, sore throat and trouble swallowing  Negative for postnasal drip and sinus pressure  Ear itch   Eyes: Negative for visual disturbance  Respiratory: Positive for cough, shortness of breath and wheezing  Cardiovascular: Positive for chest pain  Gastrointestinal: Positive for nausea and vomiting  Negative for abdominal pain  Hematological: Negative for adenopathy  Objective:      /88 (BP Location: Left arm, Patient Position: Sitting, Cuff Size: Adult)   Pulse 94   Temp 98 4 °F (36 9 °C) (Temporal)   Resp 16   Ht 5' 1 03" (1 55 m)   Wt 58 5 kg (129 lb)   BMI 24 35 kg/m²          Physical Exam   Constitutional: She is oriented to person, place, and time  She appears well-developed and well-nourished  HENT:   Head: Normocephalic and atraumatic  Left Ear: Tympanic membrane normal    Nose: Rhinorrhea present  Mouth/Throat: Uvula is midline  Posterior oropharyngeal erythema present  No posterior oropharyngeal edema  Right ear tm impacted    Cardiovascular: Normal rate, regular rhythm and normal heart sounds  Pulmonary/Chest: Effort normal and breath sounds normal    Abdominal: Soft  Bowel sounds are normal    Lymphadenopathy:     She has cervical adenopathy  Right cervical: Superficial cervical adenopathy present  Left cervical: Superficial cervical adenopathy present  Neurological: She is alert and oriented to person, place, and time  Nursing note and vitals reviewed

## 2018-11-12 DIAGNOSIS — N76.0 ACUTE VAGINITIS: Primary | ICD-10-CM

## 2018-11-12 RX ORDER — FLUCONAZOLE 150 MG/1
150 TABLET ORAL ONCE
Qty: 1 TABLET | Refills: 0 | Status: SHIPPED | OUTPATIENT
Start: 2018-11-12 | End: 2018-11-12

## 2018-11-26 ENCOUNTER — OFFICE VISIT (OUTPATIENT)
Dept: FAMILY MEDICINE CLINIC | Facility: CLINIC | Age: 23
End: 2018-11-26
Payer: COMMERCIAL

## 2018-11-26 VITALS
OXYGEN SATURATION: 99 % | WEIGHT: 123 LBS | HEIGHT: 61 IN | RESPIRATION RATE: 16 BRPM | HEART RATE: 101 BPM | DIASTOLIC BLOOD PRESSURE: 76 MMHG | SYSTOLIC BLOOD PRESSURE: 116 MMHG | BODY MASS INDEX: 23.22 KG/M2

## 2018-11-26 DIAGNOSIS — F41.9 ANXIETY: Primary | ICD-10-CM

## 2018-11-26 PROCEDURE — 99213 OFFICE O/P EST LOW 20 MIN: CPT | Performed by: NURSE PRACTITIONER

## 2018-11-26 PROCEDURE — 1036F TOBACCO NON-USER: CPT | Performed by: NURSE PRACTITIONER

## 2018-11-26 PROCEDURE — 3008F BODY MASS INDEX DOCD: CPT | Performed by: NURSE PRACTITIONER

## 2018-11-26 RX ORDER — ESCITALOPRAM OXALATE 5 MG/1
5 TABLET ORAL DAILY
Qty: 30 TABLET | Refills: 0 | Status: SHIPPED | OUTPATIENT
Start: 2018-11-26 | End: 2019-02-27 | Stop reason: ALTCHOICE

## 2018-11-26 RX ORDER — ALPRAZOLAM 0.25 MG/1
0.25 TABLET ORAL
Qty: 30 TABLET | Refills: 0 | Status: SHIPPED | OUTPATIENT
Start: 2018-11-26 | End: 2018-12-24 | Stop reason: SDUPTHER

## 2018-11-26 NOTE — PROGRESS NOTES
Chief Complaint   Patient presents with    Anxiety     Pt states she has anxiety problem and resolving  Assessment/Plan:    Anxiety  Patient has tried several medication with different side effects noted  We will try lexapro this time and add xanax very low dose for the evening for when her anxiety is not controlled  She is to continue taking melatonin  Diagnoses and all orders for this visit:    Anxiety  -     escitalopram (LEXAPRO) 5 mg tablet; Take 1 tablet (5 mg total) by mouth daily  -     ALPRAZolam (XANAX) 0 25 mg tablet; Take 1 tablet (0 25 mg total) by mouth daily at bedtime as needed for anxiety          Subjective:      Patient ID: Ryanne Andujar is a 21 y o  female  Patient recently changed her job  Her previous job was causing significant distress  We have previously tried few medication to help but were ultimately stopped due to side effects  She is here to discuss trying another medication to help with her symptoms  They occur almost daily and vary in severity  Anxiety   Presents for follow-up visit  Symptoms include chest pain, decreased concentration, excessive worry, insomnia, nausea, nervous/anxious behavior, panic and restlessness  Patient reports no depressed mood, dizziness, shortness of breath or suicidal ideas  Symptoms occur most days  The severity of symptoms is causing significant distress  The quality of sleep is fair  Nighttime awakenings: occasional            The following portions of the patient's history were reviewed and updated as appropriate: allergies, current medications, past family history, past medical history, past social history, past surgical history and problem list     Review of Systems   Constitutional: Negative for fever  HENT: Negative for trouble swallowing  Eyes: Negative for visual disturbance  Respiratory: Negative for cough, chest tightness and shortness of breath  Cardiovascular: Positive for chest pain     Gastrointestinal: Positive for nausea  Neurological: Negative for dizziness  Psychiatric/Behavioral: Positive for decreased concentration and sleep disturbance  Negative for dysphoric mood and suicidal ideas  The patient is nervous/anxious and has insomnia  Objective:      /76 (BP Location: Left arm, Patient Position: Sitting, Cuff Size: Standard)   Pulse 101   Resp 16   Ht 5' 1" (1 549 m)   Wt 55 8 kg (123 lb)   SpO2 99%   BMI 23 24 kg/m²          Physical Exam   Constitutional: She is oriented to person, place, and time  She appears well-developed and well-nourished  HENT:   Head: Normocephalic and atraumatic  Cardiovascular: Normal rate, regular rhythm and normal heart sounds  Pulmonary/Chest: Effort normal and breath sounds normal    Neurological: She is alert and oriented to person, place, and time  Psychiatric: Judgment normal  Her mood appears anxious  Her speech is rapid and/or pressured  She is not actively hallucinating  She expresses no suicidal ideation  She expresses no suicidal plans  Nursing note and vitals reviewed

## 2018-11-27 NOTE — ASSESSMENT & PLAN NOTE
Patient has tried several medication with different side effects noted  We will try lexapro this time and add xanax very low dose for the evening for when her anxiety is not controlled  She is to continue taking melatonin

## 2018-12-06 ENCOUNTER — TELEPHONE (OUTPATIENT)
Dept: FAMILY MEDICINE CLINIC | Facility: CLINIC | Age: 23
End: 2018-12-06

## 2018-12-06 DIAGNOSIS — N76.0 ACUTE VAGINITIS: ICD-10-CM

## 2018-12-06 DIAGNOSIS — R53.83 FATIGUE, UNSPECIFIED TYPE: Primary | ICD-10-CM

## 2018-12-06 RX ORDER — METRONIDAZOLE 500 MG/1
500 TABLET ORAL EVERY 12 HOURS SCHEDULED
Qty: 14 TABLET | Refills: 0 | Status: SHIPPED | OUTPATIENT
Start: 2018-12-06 | End: 2018-12-13

## 2018-12-06 NOTE — TELEPHONE ENCOUNTER
Per patient she took her Lexapro this morning and she has been having diarrhea, Abdominal pain, extreme fatigue and nausea  Please advice

## 2018-12-06 NOTE — TELEPHONE ENCOUNTER
Patient can either continue lexapro and see if symptoms resolve on one week or she can stop it  Recommend she have some blood work done  Orders entered  Also rx sent in to vaginal symptoms when she called in last this was discussed

## 2018-12-13 ENCOUNTER — TELEPHONE (OUTPATIENT)
Dept: FAMILY MEDICINE CLINIC | Facility: CLINIC | Age: 23
End: 2018-12-13

## 2018-12-13 ENCOUNTER — TRANSCRIBE ORDERS (OUTPATIENT)
Dept: ADMINISTRATIVE | Facility: HOSPITAL | Age: 23
End: 2018-12-13

## 2018-12-13 ENCOUNTER — APPOINTMENT (OUTPATIENT)
Dept: LAB | Facility: HOSPITAL | Age: 23
End: 2018-12-13
Payer: COMMERCIAL

## 2018-12-13 DIAGNOSIS — R53.83 FATIGUE, UNSPECIFIED TYPE: ICD-10-CM

## 2018-12-13 LAB
BASOPHILS # BLD AUTO: 0.05 THOUSANDS/ΜL (ref 0–0.1)
BASOPHILS NFR BLD AUTO: 1 % (ref 0–1)
EOSINOPHIL # BLD AUTO: 0.08 THOUSAND/ΜL (ref 0–0.61)
EOSINOPHIL NFR BLD AUTO: 1 % (ref 0–6)
ERYTHROCYTE [DISTWIDTH] IN BLOOD BY AUTOMATED COUNT: 12 % (ref 11.6–15.1)
HCT VFR BLD AUTO: 42.6 % (ref 34.8–46.1)
HGB BLD-MCNC: 13.5 G/DL (ref 11.5–15.4)
IMM GRANULOCYTES # BLD AUTO: 0.02 THOUSAND/UL (ref 0–0.2)
IMM GRANULOCYTES NFR BLD AUTO: 0 % (ref 0–2)
LYMPHOCYTES # BLD AUTO: 1.68 THOUSANDS/ΜL (ref 0.6–4.47)
LYMPHOCYTES NFR BLD AUTO: 26 % (ref 14–44)
MCH RBC QN AUTO: 29.3 PG (ref 26.8–34.3)
MCHC RBC AUTO-ENTMCNC: 31.7 G/DL (ref 31.4–37.4)
MCV RBC AUTO: 93 FL (ref 82–98)
MONOCYTES # BLD AUTO: 0.64 THOUSAND/ΜL (ref 0.17–1.22)
MONOCYTES NFR BLD AUTO: 10 % (ref 4–12)
NEUTROPHILS # BLD AUTO: 3.91 THOUSANDS/ΜL (ref 1.85–7.62)
NEUTS SEG NFR BLD AUTO: 62 % (ref 43–75)
NRBC BLD AUTO-RTO: 0 /100 WBCS
PLATELET # BLD AUTO: 391 THOUSANDS/UL (ref 149–390)
PMV BLD AUTO: 9.7 FL (ref 8.9–12.7)
RBC # BLD AUTO: 4.6 MILLION/UL (ref 3.81–5.12)
WBC # BLD AUTO: 6.38 THOUSAND/UL (ref 4.31–10.16)

## 2018-12-13 PROCEDURE — 36415 COLL VENOUS BLD VENIPUNCTURE: CPT

## 2018-12-13 PROCEDURE — 85025 COMPLETE CBC W/AUTO DIFF WBC: CPT

## 2018-12-13 NOTE — TELEPHONE ENCOUNTER
----- Message from [address] [place] [address] sent at 12/13/2018 11:58 AM EST -----  Blood count normal

## 2018-12-13 NOTE — TELEPHONE ENCOUNTER
Called Pt and attempted to leave message and Phone system not functioning, and no voice mail set up  For pt to call back regarding her result of blood work   Mich CORONA

## 2018-12-24 DIAGNOSIS — F41.9 ANXIETY: ICD-10-CM

## 2018-12-24 RX ORDER — ALPRAZOLAM 0.25 MG/1
0.25 TABLET ORAL
Qty: 30 TABLET | Refills: 0 | Status: SHIPPED | OUTPATIENT
Start: 2018-12-24 | End: 2019-02-04 | Stop reason: SDUPTHER

## 2018-12-24 NOTE — TELEPHONE ENCOUNTER
Patient requested medication refill on her Alprazolam to be sent to her [facility]    Last ov was:11/26/2018

## 2019-02-04 DIAGNOSIS — F41.9 ANXIETY: ICD-10-CM

## 2019-02-04 NOTE — TELEPHONE ENCOUNTER
Pt called requesting med refill for Alprazolam 0 25mg to be sent to [facility] at [facility]

## 2019-02-05 RX ORDER — ALPRAZOLAM 0.25 MG/1
0.25 TABLET ORAL
Qty: 30 TABLET | Refills: 0 | Status: SHIPPED | OUTPATIENT
Start: 2019-02-05 | End: 2019-02-27 | Stop reason: SDUPTHER

## 2019-02-06 ENCOUNTER — APPOINTMENT (EMERGENCY)
Dept: RADIOLOGY | Facility: HOSPITAL | Age: 24
End: 2019-02-06
Payer: COMMERCIAL

## 2019-02-06 ENCOUNTER — HOSPITAL ENCOUNTER (EMERGENCY)
Facility: HOSPITAL | Age: 24
Discharge: HOME | End: 2019-02-06
Attending: EMERGENCY MEDICINE
Payer: COMMERCIAL

## 2019-02-06 VITALS
BODY MASS INDEX: 24.19 KG/M2 | HEART RATE: 91 BPM | TEMPERATURE: 98 F | OXYGEN SATURATION: 96 % | WEIGHT: 128 LBS | DIASTOLIC BLOOD PRESSURE: 69 MMHG | SYSTOLIC BLOOD PRESSURE: 119 MMHG | RESPIRATION RATE: 18 BRPM
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
## 2019-02-06 DIAGNOSIS — S06.0X9A MILD CONCUSSION: ICD-10-CM

## 2019-02-06 DIAGNOSIS — T07.XXXA MULTIPLE CONTUSIONS: ICD-10-CM

## 2019-02-06 PROCEDURE — 70486 CT MAXILLOFACIAL W/O DYE: CPT

## 2019-02-06 PROCEDURE — 70450 CT HEAD/BRAIN W/O DYE: CPT

## 2019-02-06 PROCEDURE — 99284 EMERGENCY DEPT VISIT MOD MDM: CPT

## 2019-02-06 PROCEDURE — 72125 CT NECK SPINE W/O DYE: CPT

## 2019-02-06 RX ORDER — ACETAMINOPHEN 325 MG/1
650 TABLET ORAL ONCE
Status: COMPLETED | OUTPATIENT
Start: 2019-02-06 | End: 2019-02-06

## 2019-02-06 RX ORDER — ONDANSETRON 4 MG/1
4 TABLET, ORALLY DISINTEGRATING ORAL ONCE
Status: COMPLETED | OUTPATIENT
Start: 2019-02-06 | End: 2019-02-06

## 2019-02-06 RX ADMIN — ACETAMINOPHEN 650 MG: 325 TABLET, FILM COATED ORAL at 09:30

## 2019-02-06 RX ADMIN — ONDANSETRON 4 MG: 4 TABLET, ORALLY DISINTEGRATING ORAL at 09:30

## 2019-02-06 NOTE — ED NOTES
Pt talking on the phone, stopped talking but didn't hang up during during discharge instructions       Mike Koo RN  02/06/19 1013

## 2019-02-06 NOTE — DISCHARGE INSTRUCTIONS
Concussion   WHAT YOU NEED TO KNOW:   A concussion is a mild brain injury  It is usually caused by a bump or blow to the head from a fall, a motor vehicle crash, or a sports injury  Sometimes being shaken forcefully may cause a concussion  DISCHARGE INSTRUCTIONS:   Have someone else call 911 for the following:   · Someone tries to wake you and cannot do so  · You have a seizure, increasing confusion, or a change in personality  · Your speech becomes slurred, or you have new vision problems  Return to the emergency department if:   · You have a severe headache that does not go away  · You have arm or leg weakness, numbness, or new problems with coordination  · You have blood or clear fluid coming out of the ears or nose  Contact your healthcare provider if:   · You have nausea or are vomiting  · You feel more sleepy than usual     · Your symptoms get worse  · Your symptoms last longer than 6 weeks after the injury  · You have questions or concerns about your condition or care  Medicines:   · Acetaminophen  helps to decrease pain  It is available without a doctor's order  Ask how much to take and how often to take it  Follow directions  Acetaminophen can cause liver damage if not taken correctly  · NSAIDs , such as ibuprofen, help decrease swelling and pain  NSAIDs can cause stomach bleeding or kidney problems in certain people  If you take blood thinner medicine, always ask your healthcare provider if NSAIDs are safe for you  Always read the medicine label and follow directions  · Take your medicine as directed  Contact your healthcare provider if you think your medicine is not helping or if you have side effects  Tell him or her if you are allergic to any medicine  Keep a list of the medicines, vitamins, and herbs you take  Include the amounts, and when and why you take them  Bring the list or the pill bottles to follow-up visits   Carry your medicine list with you in case of an emergency  Follow up with your healthcare provider as directed:  Write down your questions so you remember to ask them during your visits  Self-care:   · Rest  from physical and mental activities as directed  Mental activities are those that require thinking, concentration, and attention  You will need to rest until your symptoms are gone  Rest will allow you to recover from your concussion  Ask your healthcare provider when you can return to work and other daily activities  · Have someone stay with you for the first 24 hours after your injury  Your healthcare provider should be contacted if your symptoms get worse, or you develop new symptoms  · Do not participate in sports and physical activities until your healthcare provider says it is okay  They could make your symptoms worse or lead to another concussion  Your healthcare provider will tell you when it is okay for you to return to sports or physical activities  Prevent another concussion:   · Wear protective sports equipment that fit properly  Helmets help decrease your risk of a serious brain injury  Talk to your healthcare provider about ways you can decrease your risk for a concussion if you play sports  · Wear your seat belt  every time you travel  This helps to decrease your risk of a head injury if you are in a car accident  © 2017 [facility] Information is for End User's use only and may not be sold, redistributed or otherwise used for commercial purposes  All illustrations and images included in CareNotes® are the copyrighted property of [employer] , [employer]  or [employer]  The above information is an  only  It is not intended as medical advice for individual conditions or treatments  Talk to your doctor, nurse or pharmacist before following any medical regimen to see if it is safe and effective for you
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
## 2019-02-07 ENCOUNTER — OFFICE VISIT (OUTPATIENT)
Dept: OBGYN CLINIC | Facility: CLINIC | Age: 24
End: 2019-02-07
Payer: COMMERCIAL

## 2019-02-07 VITALS
BODY MASS INDEX: 23.86 KG/M2 | SYSTOLIC BLOOD PRESSURE: 120 MMHG | WEIGHT: 126.4 LBS | HEIGHT: 61 IN | DIASTOLIC BLOOD PRESSURE: 80 MMHG

## 2019-02-07 DIAGNOSIS — Z97.5 IUD (INTRAUTERINE DEVICE) IN PLACE: Primary | ICD-10-CM

## 2019-02-07 PROCEDURE — 58301 REMOVE INTRAUTERINE DEVICE: CPT | Performed by: OBSTETRICS & GYNECOLOGY

## 2019-02-07 NOTE — PROGRESS NOTES
Iud removal  Date/Time: 2/7/2019 4:13 PM  Performed by: Jax Noyola by: Jazlyn Menon     Consent:     Consent obtained:  Written    Consent given by:  Patient    Procedure risks and benefits discussed: yes      Patient questions answered: yes      Patient agrees, verbalizes understanding, and wants to proceed: yes      Educational handouts given: yes      Instructions and paperwork completed: yes    Procedure:     Removed with no complications: yes      Removal due to mechanical complications of IUD: no      Removal due to infection and inflammatory reaction: no      Other reason for removal:  Patient request attempting pregnancy  Comments:      IUD removed without complication follow-up in 3 months and p r n

## 2019-02-27 ENCOUNTER — OFFICE VISIT (OUTPATIENT)
Dept: FAMILY MEDICINE CLINIC | Facility: CLINIC | Age: 24
End: 2019-02-27
Payer: COMMERCIAL

## 2019-02-27 VITALS
BODY MASS INDEX: 24.55 KG/M2 | TEMPERATURE: 98.6 F | DIASTOLIC BLOOD PRESSURE: 86 MMHG | HEART RATE: 100 BPM | WEIGHT: 130 LBS | SYSTOLIC BLOOD PRESSURE: 128 MMHG | HEIGHT: 61 IN | RESPIRATION RATE: 18 BRPM

## 2019-02-27 DIAGNOSIS — R00.0 TACHYCARDIA: Primary | ICD-10-CM

## 2019-02-27 DIAGNOSIS — R07.89 ATYPICAL CHEST PAIN: ICD-10-CM

## 2019-02-27 DIAGNOSIS — F41.9 ANXIETY: ICD-10-CM

## 2019-02-27 DIAGNOSIS — M79.644 FINGER PAIN, RIGHT: ICD-10-CM

## 2019-02-27 PROCEDURE — 3008F BODY MASS INDEX DOCD: CPT | Performed by: NURSE PRACTITIONER

## 2019-02-27 PROCEDURE — 1036F TOBACCO NON-USER: CPT | Performed by: NURSE PRACTITIONER

## 2019-02-27 PROCEDURE — 99214 OFFICE O/P EST MOD 30 MIN: CPT | Performed by: NURSE PRACTITIONER

## 2019-02-27 RX ORDER — ALPRAZOLAM 0.25 MG/1
0.25 TABLET ORAL
Qty: 90 TABLET | Refills: 0 | Status: SHIPPED | OUTPATIENT
Start: 2019-02-27

## 2019-02-27 NOTE — PROGRESS NOTES
Chief Complaint   Patient presents with    Anxiety     Patient present today for a follow up on her anxiety  Assessment/Plan:     Diagnoses and all orders for this visit:    Tachycardia  -     CBC and differential; Future  -     Comprehensive metabolic panel  -     TSH, 3rd generation with Free T4 reflex; Future    Atypical chest pain  -     Lipid panel    Anxiety  -     Comprehensive metabolic panel  -     TSH, 3rd generation with Free T4 reflex; Future  -     ALPRAZolam (XANAX) 0 25 mg tablet; Take 1 tablet (0 25 mg total) by mouth daily at bedtime as needed for anxiety    Finger pain, right  -     XR hand 3+ vw right; Future          Subjective:      Patient ID: Brynn Barton is a 21 y o  female  Patient is leaving to [facility] and she want to close some care gaps prior to leaving  patient states she been having on off symptoms of tachycardia that have been resolving within about 30 seconds  She also states that her right index finger was injured she never sought care for it and its now hurting  It has been 2 months  There is some mild swelling  The following portions of the patient's history were reviewed and updated as appropriate: allergies, current medications, past family history, past medical history, past social history, past surgical history and problem list     Review of Systems   Constitutional: Negative for chills, diaphoresis and fatigue  HENT: Negative  Respiratory: Positive for chest tightness and shortness of breath  Negative for wheezing  Cardiovascular: Positive for palpitations  Negative for chest pain  Gastrointestinal: Negative for abdominal pain, constipation and diarrhea  Genitourinary: Negative  Musculoskeletal: Negative for myalgias  Skin: Negative for wound  Neurological: Negative for dizziness, weakness, light-headedness and numbness  Psychiatric/Behavioral: Positive for sleep disturbance  Negative for suicidal ideas  The patient is nervous/anxious  Objective:      /86 (BP Location: Left arm, Patient Position: Sitting, Cuff Size: Standard)   Pulse 100   Temp 98 6 °F (37 °C) (Temporal)   Resp 18   Ht 5' 1" (1 549 m)   Wt 59 kg (130 lb)   BMI 24 56 kg/m²          Physical Exam   Constitutional: She is oriented to person, place, and time  Vital signs are normal  She appears well-developed and well-nourished  She does not have a sickly appearance  She does not appear ill  HENT:   Head: Normocephalic and atraumatic  Right Ear: Tympanic membrane normal    Left Ear: Tympanic membrane normal    Mouth/Throat: Uvula is midline, oropharynx is clear and moist and mucous membranes are normal    Eyes: Pupils are equal, round, and reactive to light  Neck: No JVD present  Carotid bruit is not present  No thyromegaly present  Cardiovascular: Regular rhythm, S1 normal and S2 normal  Tachycardia present  No murmur heard  Pulmonary/Chest: Effort normal and breath sounds normal    Abdominal: Soft  Normal appearance and bowel sounds are normal  There is no tenderness  Musculoskeletal:        Right wrist: She exhibits bony tenderness (right index finger )  Lymphadenopathy:     She has no cervical adenopathy  Neurological: She is alert and oriented to person, place, and time  She has normal strength  Skin: Skin is warm, dry and intact  No rash noted  Psychiatric: She has a normal mood and affect   Her behavior is normal  Thought content normal

## 2019-02-28 ENCOUNTER — APPOINTMENT (OUTPATIENT)
Dept: LAB | Facility: HOSPITAL | Age: 24
End: 2019-02-28
Payer: COMMERCIAL

## 2019-02-28 ENCOUNTER — TRANSCRIBE ORDERS (OUTPATIENT)
Dept: ADMINISTRATIVE | Facility: HOSPITAL | Age: 24
End: 2019-02-28

## 2019-02-28 ENCOUNTER — HOSPITAL ENCOUNTER (OUTPATIENT)
Dept: RADIOLOGY | Facility: HOSPITAL | Age: 24
Discharge: HOME | End: 2019-02-28
Payer: COMMERCIAL

## 2019-02-28 DIAGNOSIS — M79.644 FINGER PAIN, RIGHT: ICD-10-CM

## 2019-02-28 DIAGNOSIS — R00.0 TACHYCARDIA: ICD-10-CM

## 2019-02-28 DIAGNOSIS — F41.9 ANXIETY: ICD-10-CM

## 2019-02-28 LAB
ALBUMIN SERPL BCP-MCNC: 3.6 G/DL (ref 3.5–5)
ALP SERPL-CCNC: 80 U/L (ref 46–116)
ALT SERPL W P-5'-P-CCNC: 22 U/L (ref 12–78)
ANION GAP SERPL CALCULATED.3IONS-SCNC: 9 MMOL/L (ref 4–13)
AST SERPL W P-5'-P-CCNC: 17 U/L (ref 5–45)
BASOPHILS # BLD AUTO: 0.04 THOUSANDS/ΜL (ref 0–0.1)
BASOPHILS NFR BLD AUTO: 1 % (ref 0–1)
BILIRUB SERPL-MCNC: 0.4 MG/DL (ref 0.2–1)
BUN SERPL-MCNC: 11 MG/DL (ref 5–25)
CALCIUM SERPL-MCNC: 9 MG/DL (ref 8.3–10.1)
CHLORIDE SERPL-SCNC: 103 MMOL/L (ref 100–108)
CHOLEST SERPL-MCNC: 138 MG/DL (ref 50–200)
CO2 SERPL-SCNC: 27 MMOL/L (ref 21–32)
CREAT SERPL-MCNC: 0.76 MG/DL (ref 0.6–1.3)
EOSINOPHIL # BLD AUTO: 0.06 THOUSAND/ΜL (ref 0–0.61)
EOSINOPHIL NFR BLD AUTO: 1 % (ref 0–6)
ERYTHROCYTE [DISTWIDTH] IN BLOOD BY AUTOMATED COUNT: 11.6 % (ref 11.6–15.1)
GFR SERPL CREATININE-BSD FRML MDRD: 111 ML/MIN/1.73SQ M
GLUCOSE P FAST SERPL-MCNC: 86 MG/DL (ref 65–99)
HCT VFR BLD AUTO: 43.1 % (ref 34.8–46.1)
HDLC SERPL-MCNC: 50 MG/DL (ref 40–60)
HGB BLD-MCNC: 13.8 G/DL (ref 11.5–15.4)
IMM GRANULOCYTES # BLD AUTO: 0.02 THOUSAND/UL (ref 0–0.2)
IMM GRANULOCYTES NFR BLD AUTO: 0 % (ref 0–2)
LDLC SERPL CALC-MCNC: 80 MG/DL (ref 0–100)
LYMPHOCYTES # BLD AUTO: 1.43 THOUSANDS/ΜL (ref 0.6–4.47)
LYMPHOCYTES NFR BLD AUTO: 22 % (ref 14–44)
MCH RBC QN AUTO: 29.3 PG (ref 26.8–34.3)
MCHC RBC AUTO-ENTMCNC: 32 G/DL (ref 31.4–37.4)
MCV RBC AUTO: 92 FL (ref 82–98)
MONOCYTES # BLD AUTO: 0.48 THOUSAND/ΜL (ref 0.17–1.22)
MONOCYTES NFR BLD AUTO: 8 % (ref 4–12)
NEUTROPHILS # BLD AUTO: 4.34 THOUSANDS/ΜL (ref 1.85–7.62)
NEUTS SEG NFR BLD AUTO: 68 % (ref 43–75)
NONHDLC SERPL-MCNC: 88 MG/DL
NRBC BLD AUTO-RTO: 0 /100 WBCS
PLATELET # BLD AUTO: 353 THOUSANDS/UL (ref 149–390)
PMV BLD AUTO: 9.7 FL (ref 8.9–12.7)
POTASSIUM SERPL-SCNC: 4.4 MMOL/L (ref 3.5–5.3)
PROT SERPL-MCNC: 7.3 G/DL (ref 6.4–8.2)
RBC # BLD AUTO: 4.71 MILLION/UL (ref 3.81–5.12)
SODIUM SERPL-SCNC: 139 MMOL/L (ref 136–145)
TRIGL SERPL-MCNC: 40 MG/DL
TSH SERPL DL<=0.05 MIU/L-ACNC: 1.22 UIU/ML (ref 0.36–3.74)
WBC # BLD AUTO: 6.37 THOUSAND/UL (ref 4.31–10.16)

## 2019-02-28 PROCEDURE — 80061 LIPID PANEL: CPT | Performed by: NURSE PRACTITIONER

## 2019-02-28 PROCEDURE — 80053 COMPREHEN METABOLIC PANEL: CPT | Performed by: NURSE PRACTITIONER

## 2019-02-28 PROCEDURE — 84443 ASSAY THYROID STIM HORMONE: CPT

## 2019-02-28 PROCEDURE — 73130 X-RAY EXAM OF HAND: CPT

## 2019-02-28 PROCEDURE — 85025 COMPLETE CBC W/AUTO DIFF WBC: CPT

## 2019-02-28 PROCEDURE — 36415 COLL VENOUS BLD VENIPUNCTURE: CPT | Performed by: NURSE PRACTITIONER

## 2019-03-05 ENCOUNTER — TELEPHONE (OUTPATIENT)
Dept: FAMILY MEDICINE CLINIC | Facility: CLINIC | Age: 24
End: 2019-03-05

## 2019-03-05 NOTE — TELEPHONE ENCOUNTER
----- Message from [address] [place] [address] sent at 3/4/2019  4:09 PM EST -----  Xray was normal

## 2019-03-14 ENCOUNTER — TELEPHONE (OUTPATIENT)
Dept: FAMILY MEDICINE CLINIC | Facility: CLINIC | Age: 24
End: 2019-03-14
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
## 2019-03-15 NOTE — TELEPHONE ENCOUNTER
Tried to call the patient, mobile number is a physician's office number, home number is not in service

## 2021-03-19 NOTE — LETTER
October 8, 2018     Patient: Yassine Westbrook   YOB: 1995   Date of Visit: 10/8/2018       To Whom It May Concern: It is my medical opinion that Yassine Westbrook may return to work on 10/10/2018  If you have any questions or concerns, please don't hesitate to call           Sincerely,        lAon Bo PA-C    CC: No Recipients Initial (On Arrival)

## 2024-08-16 NOTE — MISCELLANEOUS
Message   Recorded as Task   Date: 03/08/2017 10:16 AM, Created By: Valentín Mcginnis   Task Name: Miscellaneous   Assigned To: [facility]   Regarding Patient: Yohana Sargent, Status: Active   CommentElois Domingo - 08 Mar 2017 10:16 AM     TASK CREATED  Hemoglobin 8 4, slightly improved from previous value  Recommend iron twice daily  Patient has avoided iron due to concerns about constipation, taking iron rich foods  Would suggest she consider iron with Carol Ann More - 08 Mar 2017 2:11 PM     TASK EDITED  informed pt        Active Problems    1  Asthma (493 90) (J45 909)   2  Dysmenorrhea (625 3) (N94 6)   3  Encounter for insertion of mirena IUD (V25 11) (Z30 430)   4  Iron deficiency anemia (280 9) (D50 9)   5  Menorrhagia (626 2) (N92 0)   6  Potential exposure to STD (V01 6) (Z20 2)    Current Meds   1  Flovent  MCG/ACT Inhalation Aerosol; INHALE 2 PUFFS TWICE DAILY; Therapy: [id number] to (Eual Slate)  Requested for: 40GBL5914; Last   Rx:03Mar2017 Ordered   2  Sprintec 28 0 25-35 MG-MCG Oral Tablet; TAKE 1 TABLET DAILY AS DIRECTED; Therapy: 09VTA4343 to (Evaluate:[id number])  Requested for: 49LAO1486; Last   Rx:11Jea0720 Ordered   3  Sprintec 28 0 25-35 MG-MCG Oral Tablet; Take as directed Recorded    Allergies    1  No Known Drug Allergies    2  Animal dander - Cats   3   Animal dander - Dogs    Signatures   Electronically signed by : Mike Hernandez, ; Mar  8 2017  2:11PM EST                       (Author) No

## 2025-06-16 ENCOUNTER — TELEPHONE (OUTPATIENT)
Age: 30
End: 2025-06-16

## 2025-06-16 NOTE — TELEPHONE ENCOUNTER
Patient calling to ask if we have her immunization records. 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 Informed patient that her immunization record shows only an Influenza vaccine in 2018. 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 I also checked in media for scanned documents, but could not find anything related to immunizations. I asked if patient wanted me to contact clinical staff and she said that is was not necessary. Patient states she will call other providers to see if they have her immunization records.